# Patient Record
Sex: FEMALE | Race: WHITE | HISPANIC OR LATINO | Employment: FULL TIME | URBAN - METROPOLITAN AREA
[De-identification: names, ages, dates, MRNs, and addresses within clinical notes are randomized per-mention and may not be internally consistent; named-entity substitution may affect disease eponyms.]

---

## 2017-03-12 ENCOUNTER — OFFICE VISIT (OUTPATIENT)
Dept: URGENT CARE | Facility: PHYSICIAN GROUP | Age: 37
End: 2017-03-12
Payer: COMMERCIAL

## 2017-03-12 VITALS
BODY MASS INDEX: 27.14 KG/M2 | TEMPERATURE: 99.9 F | HEART RATE: 104 BPM | SYSTOLIC BLOOD PRESSURE: 126 MMHG | RESPIRATION RATE: 18 BRPM | WEIGHT: 159 LBS | DIASTOLIC BLOOD PRESSURE: 78 MMHG | HEIGHT: 64 IN | OXYGEN SATURATION: 96 %

## 2017-03-12 DIAGNOSIS — J06.9 VIRAL UPPER RESPIRATORY ILLNESS: ICD-10-CM

## 2017-03-12 DIAGNOSIS — F17.200 SMOKER: ICD-10-CM

## 2017-03-12 PROCEDURE — 99203 OFFICE O/P NEW LOW 30 MIN: CPT | Performed by: FAMILY MEDICINE

## 2017-03-12 RX ORDER — BENZONATATE 200 MG/1
200 CAPSULE ORAL 3 TIMES DAILY PRN
Qty: 40 CAP | Refills: 0 | Status: SHIPPED | OUTPATIENT
Start: 2017-03-12 | End: 2019-01-24

## 2017-03-12 ASSESSMENT — ENCOUNTER SYMPTOMS
COUGH: 1
RHINORRHEA: 1
SHORTNESS OF BREATH: 1
HEADACHES: 0
HEMOPTYSIS: 0
VOMITING: 0
DIZZINESS: 0
SWEATS: 1
NAUSEA: 0
CHILLS: 1
WHEEZING: 1
SORE THROAT: 0
FEVER: 1

## 2017-03-12 NOTE — PROGRESS NOTES
Subjective:      Leo Alarcon is a 36 y.o. female who presents with Cough            Cough  This is a new problem. The current episode started more than 1 month ago. The problem has been gradually worsening. The problem occurs constantly. The cough is productive of sputum (yellow). Associated symptoms include chills, a fever, nasal congestion, postnasal drip, rhinorrhea, shortness of breath, sweats and wheezing. Pertinent negatives include no chest pain, ear congestion, ear pain, headaches, hemoptysis, rash or sore throat. The symptoms are aggravated by lying down. Treatments tried: thera-flu, nyquil, dayquil, nebulizations. The treatment provided mild relief. Her past medical history is significant for asthma, bronchitis, environmental allergies and pneumonia.       Review of Systems   Constitutional: Positive for fever and chills.   HENT: Positive for postnasal drip and rhinorrhea. Negative for ear pain and sore throat.    Respiratory: Positive for cough, shortness of breath and wheezing. Negative for hemoptysis.    Cardiovascular: Negative for chest pain.   Gastrointestinal: Negative for nausea and vomiting.   Skin: Negative for rash.   Neurological: Negative for dizziness and headaches.   Endo/Heme/Allergies: Positive for environmental allergies.     PMH:  has a past medical history of S/P tooth extraction and ASTHMA. She also has no past medical history of Diabetes or Seizure (CMS-[facility]).  MEDS:   Current outpatient prescriptions:   •  docusate sodium 100 MG CAPS, Take 100 mg by mouth 2 times a day as needed for Constipation., Disp: 60 Cap, Rfl: 0  •  ibuprofen (MOTRIN) 600 MG TABS, Take 1.5 Tabs by mouth every 6 hours as needed (Cramping)., Disp: 30 Tab, Rfl: 1  •  prenatal vit/fe fumarate/fa (PRENATAL S) 27-0.8 MG TABS, Take 1 Tab by mouth every morning., Disp: , Rfl:   ALLERGIES:   Allergies   Allergen Reactions   • Allegra-D      SURGHX: History reviewed. No pertinent past surgical history.  SOCHX:   "reports that she has been smoking Cigarettes.  She has a 5 pack-year smoking history. She does not have any smokeless tobacco history on file. She reports that she does not drink alcohol or use illicit drugs.  FH: Family history was reviewed, no pertinent findings to report       Objective:     /78 mmHg  Pulse 104  Temp(Src) 37.7 °C (99.9 °F)  Resp 18  Ht 1.626 m (5' 4.02\")  Wt 72.122 kg (159 lb)  BMI 27.28 kg/m2  SpO2 96%     Physical Exam   Constitutional: She appears well-developed.   HENT:   Head: Normocephalic.   Right Ear: External ear normal.   Left Ear: External ear normal.   Mouth/Throat: Oropharyngeal exudate present.   Nasal congestion   Eyes: Pupils are equal, round, and reactive to light. Right eye exhibits no discharge. Left eye exhibits no discharge.   Neck: Neck supple. No thyromegaly present.   Cardiovascular: Normal rate.  Exam reveals no friction rub.    No murmur heard.  Pulmonary/Chest: Effort normal. No respiratory distress. She has no wheezes.   Abdominal: Soft. She exhibits no distension. There is no tenderness. There is no guarding.   Lymphadenopathy:     She has no cervical adenopathy.   Neurological: She is alert.   Skin: Skin is warm and dry. No erythema.   Psychiatric: She has a normal mood and affect. Her behavior is normal.               Assessment/Plan:     1. Viral upper respiratory illness  benzonatate (TESSALON) 200 MG capsule   2. Smoker       Supportive care  Push fluids  Monitor temperature  Follow-up if symptoms worsen or fail to improve    monitor for dehydration, difficulty swallowing, respiratory distress wheezing, and shortness of breath. If the above symptoms should occur the patient was directed to go to the emergency department for an evaluation.    "

## 2017-03-12 NOTE — MR AVS SNAPSHOT
"        Leo Alarcon   3/12/2017 3:30 PM   Office Visit   MRN: [id number]    Department:  [facility]   Dept Phone:  [phone]    Description:  Female : 1980   Provider:  Jose Calderon M.D.           Reason for Visit     Cough cough x3 days      Allergies as of 3/12/2017     Allergen Noted Reactions    Allegra-D 2011         You were diagnosed with     Viral upper respiratory illness   [505856]       Smoker   [[id number]]         Vital Signs     Blood Pressure Pulse Temperature Respirations Height Weight    126/78 mmHg 104 37.7 °C (99.9 °F) 18 1.626 m (5' 4.02\") 72.122 kg (159 lb)    Body Mass Index Oxygen Saturation Smoking Status             27.28 kg/m2 96% Current Every Day Smoker         Basic Information     Date Of Birth Sex Race Ethnicity Preferred Language    1980 Female Other, White Non- English      Problem List              ICD-10-CM Priority Class Noted - Resolved    Labor and delivery indication for care or intervention O75.9   2014 - Present    Polyhydramnios O40.9XX0   2014 - Present    Smoker F17.200   3/12/2017 - Present      Health Maintenance        Date Due Completion Dates    PAP SMEAR 2001 ---    IMM INFLUENZA (1) 2016 ---    IMM DTaP/Tdap/Td Vaccine (2 - Td) 2024            Current Immunizations     Pneumococcal polysaccharide vaccine (PPSV-23) 2014 10:25 PM    Tdap Vaccine 2014  4:15 PM      Below and/or attached are the medications your provider expects you to take. Review all of your home medications and newly ordered medications with your provider and/or pharmacist. Follow medication instructions as directed by your provider and/or pharmacist. Please keep your medication list with you and share with your provider. Update the information when medications are discontinued, doses are changed, or new medications (including over-the-counter products) are added; and carry medication information at all " times in the event of emergency situations     Allergies:  ALLEGRA-D - (reactions not documented)               Medications  Valid as of: March 12, 2017 -  3:54 PM    Generic Name Brand Name Tablet Size Instructions for use    Benzonatate (Cap) TESSALON 200 MG Take 1 Cap by mouth 3 times a day as needed for Cough.        Docusate Sodium (Cap)  MG Take 100 mg by mouth 2 times a day as needed for Constipation.        Ibuprofen (Tab) MOTRIN 600 MG Take 1.5 Tabs by mouth every 6 hours as needed (Cramping).        Prenatal Vit-Fe Fumarate-FA (Tab) PRENATAL S 27-0.8 MG Take 1 Tab by mouth every morning.        .                 Medicines prescribed today were sent to:     [facility] #[id number] - [place], [place] - [address] AT [address] [address]    Phone: [phone] Fax: [phone]    Open 24 Hours?: No      Medication refill instructions:       If your prescription bottle indicates you have medication refills left, it is not necessary to call your provider’s office. Please contact your pharmacy and they will refill your medication.    If your prescription bottle indicates you do not have any refills left, you may request refills at any time through one of the following ways: The online edPULSE system (except Urgent Care), by calling your provider’s office, or by asking your pharmacy to contact your provider’s office with a refill request. Medication refills are processed only during regular business hours and may not be available until the next business day. Your provider may request additional information or to have a follow-up visit with you prior to refilling your medication.   *Please Note: Medication refills are assigned a new Rx number when refilled electronically. Your pharmacy may indicate that no refills were authorized even though a new prescription for the same medication is available at the pharmacy. Please request the medicine by name with the pharmacy  before contacting your provider for a refill.           Tripnary Access Code: [email or web address]  Expires: 3/31/2017  3:19 PM    Tripnary  A secure, online tool to manage your health information     [employer]’s Tripnary® is a secure, online tool that connects you to your personalized health information from the privacy of your home -- day or night - making it very easy for you to manage your healthcare. Once the activation process is completed, you can even access your medical information using the Tripnary kris, which is available for free in the Apple Kris store or Google Play store.     Tripnary provides the following levels of access (as shown below):   My Chart Features   [facility] Primary Care Doctor [facility]  Specialists [facility]  Urgent  Care Non-[facility]  Primary Care  Doctor   Email your healthcare team securely and privately 24/7 X X X    Manage appointments: schedule your next appointment; view details of past/upcoming appointments X      Request prescription refills. X      View recent personal medical records, including lab and immunizations X X X X   View health record, including health history, allergies, medications X X X X   Read reports about your outpatient visits, procedures, consult and ER notes X X X X   See your discharge summary, which is a recap of your hospital and/or ER visit that includes your diagnosis, lab results, and care plan. X X       How to register for Tripnary:  1. Go to  [email or web address].  2. Click on the Sign Up Now box, which takes you to the New Member Sign Up page. You will need to provide the following information:  a. Enter your Tripnary Access Code exactly as it appears at the top of this page. (You will not need to use this code after you’ve completed the sign-up process. If you do not sign up before the expiration date, you must request a new code.)   b. Enter your date of birth.   c. Enter your home email address.   d. Click Submit, and follow the next screen’s  instructions.  3. Create a [email or web address] ID. This will be your MusicGremlin login ID and cannot be changed, so think of one that is secure and easy to remember.  4. Create a [email or web address] password. You can change your password at any time.  5. Enter your Password Reset Question and Answer. This can be used at a later time if you forget your password.   6. Enter your e-mail address. This allows you to receive e-mail notifications when new information is available in MusicGremlin.  7. Click Sign Up. You can now view your health information.    For assistance activating your MusicGremlin account, call [phone]        Quit Tobacco Information     Do you want to quit using tobacco?    Quitting tobacco decreases risks of cancer, heart and lung disease, increases life expectancy, improves sense of taste and smell, and increases spending money, among other benefits.    If you are thinking about quitting, we can help.  • [facility] [place] [facility] Quit Tobacco Program: [phone]  o Program occurs weekly for four weeks and includes pharmacist consultation on products to support quitting smoking or chewing tobacco. A provider referral is needed for pharmacist consultation.  • Tobacco Users Help Hotline: 5-230-QUIT-NOW (867-3225) or https://[place].quitlogix.org/  o Free, confidential telephone and online coaching for [place] residents. Sessions are designed on a schedule that is convenient for you. Eligible clients receive free nicotine replacement therapy.  • Nationally: www.smokefree.gov  o Information and professional assistance to support both immediate and long-term needs as you become, and remain, a non-smoker. Smokefree.gov allows you to choose the help that best fits your needs.

## 2017-03-12 NOTE — Clinical Note
March 12, 2017         Patient: Leo Alarcon   YOB: 1980   Date of Visit: 3/12/2017           To Whom it May Concern:    Leo Alarcon was seen in my clinic on 3/12/2017.    If you have any questions or concerns, please don't hesitate to call.        Sincerely,           Jose Calderon M.D.  Electronically Signed

## 2017-04-21 ENCOUNTER — OFFICE VISIT (OUTPATIENT)
Dept: URGENT CARE | Facility: PHYSICIAN GROUP | Age: 37
End: 2017-04-21
Payer: COMMERCIAL

## 2017-04-21 ENCOUNTER — HOSPITAL ENCOUNTER (OUTPATIENT)
Facility: MEDICAL CENTER | Age: 37
End: 2017-04-21
Attending: PHYSICIAN ASSISTANT
Payer: COMMERCIAL

## 2017-04-21 VITALS
SYSTOLIC BLOOD PRESSURE: 120 MMHG | BODY MASS INDEX: 27.14 KG/M2 | TEMPERATURE: 98 F | DIASTOLIC BLOOD PRESSURE: 90 MMHG | HEART RATE: 95 BPM | RESPIRATION RATE: 14 BRPM | WEIGHT: 159 LBS | OXYGEN SATURATION: 99 % | HEIGHT: 64 IN

## 2017-04-21 DIAGNOSIS — L02.91 ABSCESS: ICD-10-CM

## 2017-04-21 LAB
GRAM STN SPEC: NORMAL
SIGNIFICANT IND 70042: NORMAL
SITE SITE: NORMAL
SOURCE SOURCE: NORMAL

## 2017-04-21 PROCEDURE — 87205 SMEAR GRAM STAIN: CPT

## 2017-04-21 PROCEDURE — 10060 I&D ABSCESS SIMPLE/SINGLE: CPT | Performed by: PHYSICIAN ASSISTANT

## 2017-04-21 PROCEDURE — 87070 CULTURE OTHR SPECIMN AEROBIC: CPT

## 2017-04-21 RX ORDER — HYDROCODONE BITARTRATE AND ACETAMINOPHEN 5; 325 MG/1; MG/1
1 TABLET ORAL EVERY 4 HOURS PRN
Qty: 15 TAB | Refills: 0 | Status: SHIPPED | OUTPATIENT
Start: 2017-04-21 | End: 2019-01-24

## 2017-04-21 RX ORDER — SULFAMETHOXAZOLE AND TRIMETHOPRIM 800; 160 MG/1; MG/1
1 TABLET ORAL 2 TIMES DAILY
Qty: 14 TAB | Refills: 0 | Status: SHIPPED | OUTPATIENT
Start: 2017-04-21 | End: 2017-04-28

## 2017-04-21 ASSESSMENT — ENCOUNTER SYMPTOMS
FEVER: 0
COUGH: 0
SHORTNESS OF BREATH: 0
PALPITATIONS: 0

## 2017-04-21 ASSESSMENT — PATIENT HEALTH QUESTIONNAIRE - PHQ9: CLINICAL INTERPRETATION OF PHQ2 SCORE: 0

## 2017-04-21 ASSESSMENT — PAIN SCALES - GENERAL: PAINLEVEL: 10=SEVERE PAIN

## 2017-04-21 NOTE — PATIENT INSTRUCTIONS
Abscess  Care After  An abscess (also called a boil or furuncle) is an infected area that contains a collection of pus. Signs and symptoms of an abscess include pain, tenderness, redness, or hardness, or you may feel a moveable soft area under your skin. An abscess can occur anywhere in the body. The infection may spread to surrounding tissues causing cellulitis. A cut (incision) by the surgeon was made over your abscess and the pus was drained out. Gauze may have been packed into the space to provide a drain that will allow the cavity to heal from the inside outwards. The boil may be painful for 5 to 7 days. Most people with a boil do not have high fevers. Your abscess, if seen early, may not have localized, and may not have been lanced. If not, another appointment may be required for this if it does not get better on its own or with medications.  HOME CARE INSTRUCTIONS   · Only take over-the-counter or prescription medicines for pain, discomfort, or fever as directed by your caregiver.  · When you bathe, soak and then remove gauze or iodoform packs at least daily or as directed by your caregiver. You may then wash the wound gently with mild soapy water. Repack with gauze or do as your caregiver directs.  SEEK IMMEDIATE MEDICAL CARE IF:   · You develop increased pain, swelling, redness, drainage, or bleeding in the wound site.  · You develop signs of generalized infection including muscle aches, chills, fever, or a general ill feeling.  · An oral temperature above 102° F (38.9° C) develops, not controlled by medication.  See your caregiver for a recheck if you develop any of the symptoms described above. If medications (antibiotics) were prescribed, take them as directed.  Document Released: 07/06/2006 Document Revised: 03/11/2013 Document Reviewed: 03/02/2009  [employer]® Patient Information ©2014 [employer].

## 2017-04-21 NOTE — PROGRESS NOTES
Subjective:      Leo Alarcon is a 36 y.o. female who presents with Knee Pain and Cyst            Cyst  This is a new problem. The current episode started in the past 7 days. The problem occurs constantly. The problem has been gradually worsening. Pertinent negatives include no chest pain, coughing or fever. Associated symptoms comments: Redness and swelling on right buttox. She has tried nothing for the symptoms.       Review of Systems   Constitutional: Negative for fever and malaise/fatigue.   Respiratory: Negative for cough and shortness of breath.    Cardiovascular: Negative for chest pain and palpitations.   Skin:        Abscess on right buttox     All other systems reviewed and are negative.  PMH:  has a past medical history of S/P tooth extraction and ASTHMA. She also has no past medical history of Diabetes or Seizure (CMS-[facility]).  MEDS:   Current outpatient prescriptions:   •  sulfamethoxazole-trimethoprim (BACTRIM DS) 800-160 MG tablet, Take 1 Tab by mouth 2 times a day for 7 days., Disp: 14 Tab, Rfl: 0  •  hydrocodone-acetaminophen (NORCO) 5-325 MG Tab per tablet, Take 1 Tab by mouth every four hours as needed., Disp: 15 Tab, Rfl: 0  •  benzonatate (TESSALON) 200 MG capsule, Take 1 Cap by mouth 3 times a day as needed for Cough. (Patient not taking: Reported on 4/21/2017), Disp: 40 Cap, Rfl: 0  •  docusate sodium 100 MG CAPS, Take 100 mg by mouth 2 times a day as needed for Constipation. (Patient not taking: Reported on 4/21/2017), Disp: 60 Cap, Rfl: 0  •  ibuprofen (MOTRIN) 600 MG TABS, Take 1.5 Tabs by mouth every 6 hours as needed (Cramping)., Disp: 30 Tab, Rfl: 1  •  prenatal vit/fe fumarate/fa (PRENATAL S) 27-0.8 MG TABS, Take 1 Tab by mouth every morning., Disp: , Rfl:   ALLERGIES:   Allergies   Allergen Reactions   • Allegra-D      SURGHX: History reviewed. No pertinent past surgical history.  SOCHX:  reports that she has been smoking Cigarettes.  She has a 5 pack-year smoking history. She  "has never used smokeless tobacco. She reports that she does not drink alcohol or use illicit drugs.  FH: Family history was reviewed, no pertinent findings to report  Medications, Allergies, and current problem list reviewed today in Epic       Objective:     /90 mmHg  Pulse 95  Temp(Src) 36.7 °C (98 °F)  Resp 14  Ht 1.626 m (5' 4\")  Wt 72.122 kg (159 lb)  BMI 27.28 kg/m2  SpO2 99%  Breastfeeding? No     Physical Exam   Constitutional: She appears well-developed and well-nourished.   Cardiovascular: Normal rate, regular rhythm and normal heart sounds.    Pulmonary/Chest: Effort normal and breath sounds normal.   Musculoskeletal:        Legs:  Skin: Skin is warm and dry. There is erythema.   Abscess on right buttox with surrounding erythema.  Approx 2 cm diameter.   Psychiatric: She has a normal mood and affect. Her behavior is normal. Judgment and thought content normal.   Vitals reviewed.              Assessment/Plan:   Procedure: Incision and Drainage  -Risks, benefits, and alternatives discussed. Risks including infection, bleeding, nerve damage, and poor cosmetic outcome  -Sterile technique throughout  -Local anesthesia with 2% lidocaine with epinephrine  -Incision with #11 blade into fluctuant area with purulent material expressed  -Culture obtained and packaged for lab  -Cavity probed and any loculations bluntly taken down with hemostat  -Irrigated copiously with NS  -Packed with 1/4\" gauze  -Minimal bleeding with good hemostasis achieved  -The patient tolerated the procedure well    1. Abscess    - CULTURE WOUND W/ GRAM STAIN; Future  - sulfamethoxazole-trimethoprim (BACTRIM DS) 800-160 MG tablet; Take 1 Tab by mouth 2 times a day for 7 days.  Dispense: 14 Tab; Refill: 0  - hydrocodone-acetaminophen (NORCO) 5-325 MG Tab per tablet; Take 1 Tab by mouth every four hours as needed.  Dispense: 15 Tab; Refill: 0  - Follow up in 2 days for wound check    Differential diagnosis, natural history, " supportive care, and indications for immediate follow-up discussed at length.   Follow-up with primary care provider within 4-5 days, emergency room precautions discussed.  Patient and/or family appears understanding of information.  [facility] Aware web site evaluation: I have obtained and reviewed patient utilization report from [facility] pharmacy database prior to writing prescription for controlled substance II, III or IV per [place] bill . Based on the report and my clinical assessment the prescription is medically necessary.

## 2017-04-21 NOTE — MR AVS SNAPSHOT
"        Leo Alarcon   2017 10:50 AM   Office Visit   MRN: [id number]    Department:  [facility]   Dept Phone:  [phone]    Description:  Female : 1980   Provider:  Ottoniel Serrato PA-C           Reason for Visit     Knee Pain swollen x5days     Cyst R bottom      Allergies as of 2017     Allergen Noted Reactions    Allegra-D 2011         You were diagnosed with     Abscess   [[id number]]         Vital Signs     Blood Pressure Pulse Temperature Respirations Height Weight    120/90 mmHg 95 36.7 °C (98 °F) 14 1.626 m (5' 4\") 72.122 kg (159 lb)    Body Mass Index Oxygen Saturation Breastfeeding? Smoking Status          27.28 kg/m2 99% No Current Every Day Smoker        Basic Information     Date Of Birth Sex Race Ethnicity Preferred Language    1980 Female Other, White Non- English      Problem List              ICD-10-CM Priority Class Noted - Resolved    Labor and delivery indication for care or intervention O75.9   2014 - Present    Polyhydramnios O40.9XX0   2014 - Present    Smoker F17.200   3/12/2017 - Present      Health Maintenance        Date Due Completion Dates    PAP SMEAR 2001 ---    IMM DTaP/Tdap/Td Vaccine (2 - Td) 2024            Current Immunizations     Pneumococcal polysaccharide vaccine (PPSV-23) 2014 10:25 PM    Tdap Vaccine 2014  4:15 PM      Below and/or attached are the medications your provider expects you to take. Review all of your home medications and newly ordered medications with your provider and/or pharmacist. Follow medication instructions as directed by your provider and/or pharmacist. Please keep your medication list with you and share with your provider. Update the information when medications are discontinued, doses are changed, or new medications (including over-the-counter products) are added; and carry medication information at all times in the event of emergency situations    " Allergies:  ALLEGRA-D - (reactions not documented)               Medications  Valid as of: April 21, 2017 - 11:56 AM    Generic Name Brand Name Tablet Size Instructions for use    Benzonatate (Cap) TESSALON 200 MG Take 1 Cap by mouth 3 times a day as needed for Cough.        Docusate Sodium (Cap)  MG Take 100 mg by mouth 2 times a day as needed for Constipation.        Hydrocodone-Acetaminophen (Tab) NORCO 5-325 MG Take 1 Tab by mouth every four hours as needed.        Ibuprofen (Tab) MOTRIN 600 MG Take 1.5 Tabs by mouth every 6 hours as needed (Cramping).        Prenatal Vit-Fe Fumarate-FA (Tab) PRENATAL S 27-0.8 MG Take 1 Tab by mouth every morning.        Sulfamethoxazole-Trimethoprim (Tab) BACTRIM -160 MG Take 1 Tab by mouth 2 times a day for 7 days.        .                 Medicines prescribed today were sent to:     [facility]/PHARMACY #[id number] - [place], [place] - [address] [address]    Phone: [phone] Fax: [phone]    Open 24 Hours?: No      Medication refill instructions:       If your prescription bottle indicates you have medication refills left, it is not necessary to call your provider’s office. Please contact your pharmacy and they will refill your medication.    If your prescription bottle indicates you do not have any refills left, you may request refills at any time through one of the following ways: The online [employer] system (except Urgent Care), by calling your provider’s office, or by asking your pharmacy to contact your provider’s office with a refill request. Medication refills are processed only during regular business hours and may not be available until the next business day. Your provider may request additional information or to have a follow-up visit with you prior to refilling your medication.   *Please Note: Medication refills are assigned a new Rx number when refilled electronically. Your pharmacy may indicate that  no refills were authorized even though a new prescription for the same medication is available at the pharmacy. Please request the medicine by name with the pharmacy before contacting your provider for a refill.        Your To Do List     Future Labs/Procedures Complete By Expires    CULTURE WOUND W/ GRAM STAIN  As directed 4/21/2018      Instructions    Abscess  Care After  An abscess (also called a boil or furuncle) is an infected area that contains a collection of pus. Signs and symptoms of an abscess include pain, tenderness, redness, or hardness, or you may feel a moveable soft area under your skin. An abscess can occur anywhere in the body. The infection may spread to surrounding tissues causing cellulitis. A cut (incision) by the surgeon was made over your abscess and the pus was drained out. Gauze may have been packed into the space to provide a drain that will allow the cavity to heal from the inside outwards. The boil may be painful for 5 to 7 days. Most people with a boil do not have high fevers. Your abscess, if seen early, may not have localized, and may not have been lanced. If not, another appointment may be required for this if it does not get better on its own or with medications.  HOME CARE INSTRUCTIONS   · Only take over-the-counter or prescription medicines for pain, discomfort, or fever as directed by your caregiver.  · When you bathe, soak and then remove gauze or iodoform packs at least daily or as directed by your caregiver. You may then wash the wound gently with mild soapy water. Repack with gauze or do as your caregiver directs.  SEEK IMMEDIATE MEDICAL CARE IF:   · You develop increased pain, swelling, redness, drainage, or bleeding in the wound site.  · You develop signs of generalized infection including muscle aches, chills, fever, or a general ill feeling.  · An oral temperature above 102° F (38.9° C) develops, not controlled by medication.  See your caregiver for a recheck if you  develop any of the symptoms described above. If medications (antibiotics) were prescribed, take them as directed.  Document Released: 07/06/2006 Document Revised: 03/11/2013 Document Reviewed: 03/02/2009  ExitCare® Patient Information ©2014 [employer].            [employer] Access Code: [email or web address]  Expires: 5/21/2017 11:51 AM    [employer]  A secure, online tool to manage your health information     [employer]’s [employer]® is a secure, online tool that connects you to your personalized health information from the privacy of your home -- day or night - making it very easy for you to manage your healthcare. Once the activation process is completed, you can even access your medical information using the Mojo Motors kris, which is available for free in the Apple Kris store or Google Play store.     Mo[employer] provides the following levels of access (as shown below):   My Chart Features   [facility]  Specialists [facility]  Urgent  Care Non-[facility]  Primary Care  Doctor   Email your healthcare team securely and privately 24/7 X X X    Manage appointments: schedule your next appointment; view details of past/upcoming appointments X      Request prescription refills. X      View recent personal medical records, including lab and immunizations X X X X   View health record, including health history, allergies, medications X X X X   Read reports about your outpatient visits, procedures, consult and ER notes X X X X   See your discharge summary, which is a recap of your hospital and/or ER visit that includes your diagnosis, lab results, and care plan. X X       How to register for [employer]:  1. Go to  [email or web address].  2. Click on the Sign Up Now box, which takes you to the New Member Sign Up page. You will need to provide the following information:  a. Enter your Mo[employer] Access Code exactly as it appears at the top of this page. (You will not need to use this code after you’ve completed the sign-up  process. If you do not sign up before the expiration date, you must request a new code.)   b. Enter your date of birth.   c. Enter your home email address.   d. Click Submit, and follow the next screen’s instructions.  3. Create a One Diary ID. This will be your One Diary login ID and cannot be changed, so think of one that is secure and easy to remember.  4. Create a [employer] S[employer] password. You can change your password at any time.  5. Enter your Password Reset Question and Answer. This can be used at a later time if you forget your password.   6. Enter your e-mail address. This allows you to receive e-mail notifications when new information is available in One Diary.  7. Click Sign Up. You can now view your health information.    For assistance activating your One Diary account, call [phone]        Quit Tobacco Information     Do you want to quit using tobacco?    Quitting tobacco decreases risks of cancer, heart and lung disease, increases life expectancy, improves sense of taste and smell, and increases spending money, among other benefits.    If you are thinking about quitting, we can help.  • [facility] Quit Tobacco Program: [phone]  o Program occurs weekly for four weeks and includes pharmacist consultation on products to support quitting smoking or chewing tobacco. A provider referral is needed for pharmacist consultation.  • Tobacco Users Help Hotline: 0-615-QUIT-NOW (425-2206) or https://[place].quitlogix.org/  o Free, confidential telephone and online coaching for [place] residents. Sessions are designed on a schedule that is convenient for you. Eligible clients receive free nicotine replacement therapy.  • Nationally: www.smokefree.gov  o Information and professional assistance to support both immediate and long-term needs as you become, and remain, a non-smoker. Smokefree.gov allows you to choose the help that best fits your needs.

## 2017-04-23 LAB
BACTERIA WND AEROBE CULT: NORMAL
GRAM STN SPEC: NORMAL
SIGNIFICANT IND 70042: NORMAL
SITE SITE: NORMAL
SOURCE SOURCE: NORMAL

## 2017-04-24 ENCOUNTER — OFFICE VISIT (OUTPATIENT)
Dept: URGENT CARE | Facility: PHYSICIAN GROUP | Age: 37
End: 2017-04-24
Payer: COMMERCIAL

## 2017-04-24 VITALS
HEART RATE: 95 BPM | BODY MASS INDEX: 27.31 KG/M2 | WEIGHT: 160 LBS | DIASTOLIC BLOOD PRESSURE: 84 MMHG | HEIGHT: 64 IN | TEMPERATURE: 98.3 F | OXYGEN SATURATION: 97 % | SYSTOLIC BLOOD PRESSURE: 120 MMHG

## 2017-04-24 DIAGNOSIS — Z51.89 WOUND CHECK, ABSCESS: Primary | ICD-10-CM

## 2017-04-24 DIAGNOSIS — L02.31 ABSCESS OF BUTTOCK, RIGHT: ICD-10-CM

## 2017-04-24 PROCEDURE — 99024 POSTOP FOLLOW-UP VISIT: CPT | Performed by: PHYSICIAN ASSISTANT

## 2017-04-24 NOTE — MR AVS SNAPSHOT
"        Leo Alarcon   2017 9:05 AM   Office Visit   MRN: [id number]    Department:  [facility]   Dept Phone:  [phone]    Description:  Female : 1980   Provider:  Cindy Carrasquillo PA-C           Reason for Visit     Other wound check cyst on buttocks      Allergies as of 2017     Allergen Noted Reactions    Allegra-D 2011         You were diagnosed with     Wound check, abscess   [[id number]]         Vital Signs     Blood Pressure Pulse Temperature Height Weight Body Mass Index    120/84 mmHg 95 36.8 °C (98.3 °F) 1.626 m (5' 4\") 72.576 kg (160 lb) 27.45 kg/m2    Oxygen Saturation Smoking Status                97% Current Every Day Smoker          Basic Information     Date Of Birth Sex Race Ethnicity Preferred Language    1980 Female Other, White Non- English      Problem List              ICD-10-CM Priority Class Noted - Resolved    Labor and delivery indication for care or intervention O75.9   2014 - Present    Polyhydramnios O40.9XX0   2014 - Present    Smoker F17.200   3/12/2017 - Present      Health Maintenance        Date Due Completion Dates    PAP SMEAR 2001 ---    IMM DTaP/Tdap/Td Vaccine (2 - Td) 2024            Current Immunizations     Pneumococcal polysaccharide vaccine (PPSV-23) 2014 10:25 PM    Tdap Vaccine 2014  4:15 PM      Below and/or attached are the medications your provider expects you to take. Review all of your home medications and newly ordered medications with your provider and/or pharmacist. Follow medication instructions as directed by your provider and/or pharmacist. Please keep your medication list with you and share with your provider. Update the information when medications are discontinued, doses are changed, or new medications (including over-the-counter products) are added; and carry medication information at all times in the event of emergency situations     Allergies:  ALLEGRA-D - " (reactions not documented)               Medications  Valid as of: April 24, 2017 -  9:42 AM    Generic Name Brand Name Tablet Size Instructions for use    Benzonatate (Cap) TESSALON 200 MG Take 1 Cap by mouth 3 times a day as needed for Cough.        Docusate Sodium (Cap)  MG Take 100 mg by mouth 2 times a day as needed for Constipation.        Hydrocodone-Acetaminophen (Tab) NORCO 5-325 MG Take 1 Tab by mouth every four hours as needed.        Ibuprofen (Tab) MOTRIN 600 MG Take 1.5 Tabs by mouth every 6 hours as needed (Cramping).        Prenatal Vit-Fe Fumarate-FA (Tab) PRENATAL S 27-0.8 MG Take 1 Tab by mouth every morning.        Sulfamethoxazole-Trimethoprim (Tab) BACTRIM -160 MG Take 1 Tab by mouth 2 times a day for 7 days.        .                 Medicines prescribed today were sent to:     [facility]/PHARMACY #[id number] - [place], [place] - [address] [facility] AT [address] [address]    Phone: [phone] Fax: [phone]    Open 24 Hours?: No      Medication refill instructions:       If your prescription bottle indicates you have medication refills left, it is not necessary to call your provider’s office. Please contact your pharmacy and they will refill your medication.    If your prescription bottle indicates you do not have any refills left, you may request refills at any time through one of the following ways: The online [employer] system (except Urgent Care), by calling your provider’s office, or by asking your pharmacy to contact your provider’s office with a refill request. Medication refills are processed only during regular business hours and may not be available until the next business day. Your provider may request additional information or to have a follow-up visit with you prior to refilling your medication.   *Please Note: Medication refills are assigned a new Rx number when refilled electronically. Your pharmacy may indicate that no refills were  authorized even though a new prescription for the same medication is available at the pharmacy. Please request the medicine by name with the pharmacy before contacting your provider for a refill.           Am[employer] Software Access Code: [email or web address]  Expires: 5/21/2017 11:51 AM    Amcom Software  A secure, online tool to manage your health information     [employer]’s Amcom Software® is a secure, online tool that connects you to your personalized health information from the privacy of your home -- day or night - making it very easy for you to manage your healthcare. Once the activation process is completed, you can even access your medical information using the Amcom Software kris, which is available for free in the Apple Kris store or Google Play store.     Amcom Software provides the following levels of access (as shown below):   My Chart Features   [facility] Doctor [facility]  Specialists [facility]  Urgent  Care Non-[facility]  Primary Care  Doctor   Email your healthcare team securely and privately 24/7 X X X    Manage appointments: schedule your next appointment; view details of past/upcoming appointments X      Request prescription refills. X      View recent personal medical records, including lab and immunizations X X X X   View health record, including health history, allergies, medications X X X X   Read reports about your outpatient visits, procedures, consult and ER notes X X X X   See your discharge summary, which is a recap of your hospital and/or ER visit that includes your diagnosis, lab results, and care plan. X X       How to register for Amcom Software:  1. Go to  [email or web address].  2. Click on the Sign Up Now box, which takes you to the New Member Sign Up page. You will need to provide the following information:  a. Enter your Amcom Software Access Code exactly as it appears at the top of this page. (You will not need to use this code after you’ve completed the sign-up process. If you do not sign up before the expiration date, you  must request a new code.)   b. Enter your date of birth.   c. Enter your home email address.   d. Click Submit, and follow the next screen’s instructions.  3. Create a Wikkit LLCt ID. This will be your Netseer login ID and cannot be changed, so think of one that is secure and easy to remember.  4. Create a Wik[employer] LLCt password. You can change your password at any time.  5. Enter your Password Reset Question and Answer. This can be used at a later time if you forget your password.   6. Enter your e-mail address. This allows you to receive e-mail notifications when new information is available in Netseer.  7. Click Sign Up. You can now view your health information.    For assistance activating your Netseer account, call [phone]        Quit Tobacco Information     Do you want to quit using tobacco?    Quitting tobacco decreases risks of cancer, heart and lung disease, increases life expectancy, improves sense of taste and smell, and increases spending money, among other benefits.    If you are thinking about quitting, we can help.  • [facility]uit [facility]: 467.260.6194  o Program occurs weekly for four weeks and includes pharmacist consultation on products to support quitting smoking or chewing tobacco. A provider referral is needed for pharmacist consultation.  • Tobacco Users Help Hotline: 5-358-QUIT-NOW (516-5903) or https://[place].quitlogix.org/  o Free, confidential telephone and online coaching for [place] residents. Sessions are designed on a schedule that is convenient for you. Eligible clients receive free nicotine replacement therapy.  • Nationally: www.smokefree.gov  o Information and professional assistance to support both immediate and long-term needs as you become, and remain, a non-smoker. Smokefree.gov allows you to choose the help that best fits your needs.

## 2017-04-24 NOTE — PROGRESS NOTES
HPI:  Patient seen in urgent care 3 days ago for incision and drainage of abscess to right buttock.  She returns today for wound recheck.  Currently on Bactrim  pending wound culture results.  Patient denies any fevers, increased pain or redness to the wound.  Patient denies and numbnes or weakness surrounding the site.     PE:  Vitals:  Tc  Gen: AOx4, NAD  Neuro/Vas/Tendon: no vascular compromise, sensation normal, no tendon injury, normal rom.  Skin: Healing wound, no extending erythema, some mild discharge noted on iodoform packing.      Wound irrigated and repacked.    PT to return in 3 days for wound check    A/P:  Abcess Buttock

## 2017-04-24 NOTE — Clinical Note
April 24, 2017         Patient: Leo Alarcon   YOB: 1980   Date of Visit: 4/24/2017           To Whom it May Concern:    Leo Alarcon was seen in my clinic on 4/24/2017. She may return to work today..    If you have any questions or concerns, please don't hesitate to call.        Sincerely,           Cindy Carrasquillo PA-C  Electronically Signed

## 2017-04-28 ENCOUNTER — APPOINTMENT (OUTPATIENT)
Dept: URGENT CARE | Facility: PHYSICIAN GROUP | Age: 37
End: 2017-04-28
Payer: COMMERCIAL

## 2017-04-28 ENCOUNTER — OFFICE VISIT (OUTPATIENT)
Dept: URGENT CARE | Facility: PHYSICIAN GROUP | Age: 37
End: 2017-04-28
Payer: COMMERCIAL

## 2017-04-28 VITALS
HEART RATE: 90 BPM | HEIGHT: 64 IN | TEMPERATURE: 98.6 F | SYSTOLIC BLOOD PRESSURE: 122 MMHG | RESPIRATION RATE: 18 BRPM | BODY MASS INDEX: 27.31 KG/M2 | DIASTOLIC BLOOD PRESSURE: 82 MMHG | WEIGHT: 160 LBS | OXYGEN SATURATION: 97 %

## 2017-04-28 DIAGNOSIS — Z51.89 WOUND CHECK, ABSCESS: ICD-10-CM

## 2017-04-28 PROCEDURE — 99024 POSTOP FOLLOW-UP VISIT: CPT | Performed by: PHYSICIAN ASSISTANT

## 2017-04-28 ASSESSMENT — ENCOUNTER SYMPTOMS
FEVER: 0
VOMITING: 0
TINGLING: 0
FALLS: 0
SENSORY CHANGE: 0
CHILLS: 0
DIARRHEA: 0
ABDOMINAL PAIN: 0

## 2017-04-28 NOTE — PROGRESS NOTES
"Subjective:      Leo Alarcon is a 36 y.o. female who presents with Wound Check        HPI: Patient seen in urgent care on 4/21 for incision and drainage of abscess to right buttock.  She returns today for wound recheck.  She has completed Bactrim without any difficulty- identification of pathogen is pending.   Patient denies any fevers, increased pain or redness to the wound.  Patient denies and numbnes or weakness surrounding the site. She reports slight drainage from the site and is changing bandage daily.     Wound Check        Review of Systems   Constitutional: Negative for fever, chills and malaise/fatigue.   Gastrointestinal: Negative for vomiting, abdominal pain and diarrhea.   Musculoskeletal: Negative for joint pain and falls.   Skin: Negative for itching and rash.   Neurological: Negative for tingling and sensory change.          Objective:     /82 mmHg  Pulse 90  Temp(Src) 37 °C (98.6 °F)  Resp 18  Ht 1.626 m (5' 4.02\")  Wt 72.576 kg (160 lb)  BMI 27.45 kg/m2  SpO2 97%   PMH:  has a past medical history of S/P tooth extraction and ASTHMA. She also has no past medical history of Diabetes or Seizure (CMS-[facility]).  MEDS:   Current outpatient prescriptions:   •  sulfamethoxazole-trimethoprim (BACTRIM DS) 800-160 MG tablet, Take 1 Tab by mouth 2 times a day for 7 days., Disp: 14 Tab, Rfl: 0  •  hydrocodone-acetaminophen (NORCO) 5-325 MG Tab per tablet, Take 1 Tab by mouth every four hours as needed., Disp: 15 Tab, Rfl: 0  •  benzonatate (TESSALON) 200 MG capsule, Take 1 Cap by mouth 3 times a day as needed for Cough., Disp: 40 Cap, Rfl: 0  •  docusate sodium 100 MG CAPS, Take 100 mg by mouth 2 times a day as needed for Constipation. (Patient not taking: Reported on 4/21/2017), Disp: 60 Cap, Rfl: 0  •  ibuprofen (MOTRIN) 600 MG TABS, Take 1.5 Tabs by mouth every 6 hours as needed (Cramping)., Disp: 30 Tab, Rfl: 1  •  prenatal vit/fe fumarate/fa (PRENATAL S) 27-0.8 MG TABS, Take 1 Tab by " mouth every morning., Disp: , Rfl:   ALLERGIES:   Allergies   Allergen Reactions   • Allegra-D      SURGHX: History reviewed. No pertinent past surgical history.  SOCHX:  reports that she has been smoking Cigarettes.  She has a 5 pack-year smoking history. She has never used smokeless tobacco. She reports that she does not drink alcohol or use illicit drugs.  FH: Family history was reviewed, no pertinent findings to report    Physical Exam   Constitutional: She is oriented to person, place, and time. She appears well-developed and well-nourished.   HENT:   Head: Normocephalic and atraumatic.   Eyes: EOM are normal. Pupils are equal, round, and reactive to light.   Neck: Normal range of motion. Neck supple.   Cardiovascular: Normal rate.    Pulmonary/Chest: Effort normal. No respiratory distress.   Neurological: She is alert and oriented to person, place, and time.   Skin:   Left buttock- small less than 1 cm open wound- noted packing intact- noted purulent discharge on packing. Irrigated. And continued purulent discharge expelled.   Irrigated. Repacked. Minimal surrounding erythema without any evidence of further cellulitis.    Vitals reviewed.              Assessment/Plan:     1. Wound check, abscess    Continue with dressing changes- packing given to patient. Wound depth is less than 1cm- may not need repacking next visit. RTC in 3 days- unable to return in 2. Other wound care discussed.   Patient given precautionary s/sx that mandate immediate follow up and evaluation in the ED. Advised of risks of not doing so.    DDX, Supportive care, and indications for immediate follow-up discussed with patient.    Instructed to return to clinic or nearest emergency department if we are not available for any change in condition, further concerns, or worsening of symptoms.    The patient demonstrated a good understanding and agreed with the treatment plan.

## 2017-05-01 ENCOUNTER — OFFICE VISIT (OUTPATIENT)
Dept: URGENT CARE | Facility: PHYSICIAN GROUP | Age: 37
End: 2017-05-01
Payer: COMMERCIAL

## 2017-05-01 VITALS
WEIGHT: 160 LBS | TEMPERATURE: 97 F | OXYGEN SATURATION: 97 % | SYSTOLIC BLOOD PRESSURE: 112 MMHG | BODY MASS INDEX: 27.31 KG/M2 | HEIGHT: 64 IN | DIASTOLIC BLOOD PRESSURE: 60 MMHG | HEART RATE: 100 BPM

## 2017-05-01 DIAGNOSIS — Z51.89 WOUND CHECK, ABSCESS: ICD-10-CM

## 2017-05-01 PROCEDURE — 99024 POSTOP FOLLOW-UP VISIT: CPT | Performed by: PHYSICIAN ASSISTANT

## 2017-05-01 NOTE — MR AVS SNAPSHOT
"        Leo Alarcon   2017 8:30 AM   Office Visit   MRN: [id number]    Department:  [facility]   Dept Phone:  [phone]    Description:  Female : 1980   Provider:  Cindy Carrasquillo PA-C           Reason for Visit     Wound Check wound check// cyst on glute       Allergies as of 2017     Allergen Noted Reactions    Allegra-D 2011         Vital Signs     Blood Pressure Pulse Temperature Height Weight Body Mass Index    112/60 mmHg 100 36.1 °C (97 °F) 1.626 m (5' 4.02\") 72.576 kg (160 lb) 27.45 kg/m2    Oxygen Saturation Smoking Status                97% Current Every Day Smoker          Basic Information     Date Of Birth Sex Race Ethnicity Preferred Language    1980 Female Other, White Non- English      Problem List              ICD-10-CM Priority Class Noted - Resolved    Labor and delivery indication for care or intervention O75.9   2014 - Present    Polyhydramnios O40.9XX0   2014 - Present    Smoker F17.200   3/12/2017 - Present      Health Maintenance        Date Due Completion Dates    PAP SMEAR 2001 ---    IMM DTaP/Tdap/Td Vaccine (2 - Td) 2024            Current Immunizations     Pneumococcal polysaccharide vaccine (PPSV-23) 2014 10:25 PM    Tdap Vaccine 2014  4:15 PM      Below and/or attached are the medications your provider expects you to take. Review all of your home medications and newly ordered medications with your provider and/or pharmacist. Follow medication instructions as directed by your provider and/or pharmacist. Please keep your medication list with you and share with your provider. Update the information when medications are discontinued, doses are changed, or new medications (including over-the-counter products) are added; and carry medication information at all times in the event of emergency situations     Allergies:  ALLEGRA-D - (reactions not documented)               Medications  Valid as of: " May 01, 2017 -  8:43 AM    Generic Name Brand Name Tablet Size Instructions for use    Benzonatate (Cap) TESSALON 200 MG Take 1 Cap by mouth 3 times a day as needed for Cough.        Docusate Sodium (Cap)  MG Take 100 mg by mouth 2 times a day as needed for Constipation.        Hydrocodone-Acetaminophen (Tab) NORCO 5-325 MG Take 1 Tab by mouth every four hours as needed.        Ibuprofen (Tab) MOTRIN 600 MG Take 1.5 Tabs by mouth every 6 hours as needed (Cramping).        Prenatal Vit-Fe Fumarate-FA (Tab) PRENATAL S 27-0.8 MG Take 1 Tab by mouth every morning.        .                 Medicines prescribed today were sent to:     [facility]ARMACY #[id number] - [place], [place] - [address] AT [address] [address]    Phone: [phone] Fax: [phone]    Open 24 Hours?: No      Medication refill instructions:       If your prescription bottle indicates you have medication refills left, it is not necessary to call your provider’s office. Please contact your pharmacy and they will refill your medication.    If your prescription bottle indicates you do not have any refills left, you may request refills at any time through one of the following ways: The online TroopSwap system (except Urgent Care), by calling your provider’s office, or by asking your pharmacy to contact your provider’s office with a refill request. Medication refills are processed only during regular business hours and may not be available until the next business day. Your provider may request additional information or to have a follow-up visit with you prior to refilling your medication.   *Please Note: Medication refills are assigned a new Rx number when refilled electronically. Your pharmacy may indicate that no refills were authorized even though a new prescription for the same medication is available at the pharmacy. Please request the medicine by name with the pharmacy before contacting your provider for a  refill.           FatTail Access Code: [email or web address]  Expires: 5/21/2017 11:51 AM    FatTail  A secure, online tool to manage your health information     [employer]’s FatTail® is a secure, online tool that connects you to your personalized health information from the privacy of your home -- day or night - making it very easy for you to manage your healthcare. Once the activation process is completed, you can even access your medical information using the FatTail kris, which is available for free in the Apple Kris store or Google Play store.     FatTail provides the following levels of access (as shown below):   My Chart Features   [facility] Primary Care Doctor [facility]  Specialists [facility]  Urgent  Care Non-[facility]  Primary Care  Doctor   Email your healthcare team securely and privately 24/7 X X X    Manage appointments: schedule your next appointment; view details of past/upcoming appointments X      Request prescription refills. X      View recent personal medical records, including lab and immunizations X X X X   View health record, including health history, allergies, medications X X X X   Read reports about your outpatient visits, procedures, consult and ER notes X X X X   See your discharge summary, which is a recap of your hospital and/or ER visit that includes your diagnosis, lab results, and care plan. X X       How to register for FatTail:  1. Go to  [email or web address].  2. Click on the Sign Up Now box, which takes you to the New Member Sign Up page. You will need to provide the following information:  a. Enter your FatTail Access Code exactly as it appears at the top of this page. (You will not need to use this code after you’ve completed the sign-up process. If you do not sign up before the expiration date, you must request a new code.)   b. Enter your date of birth.   c. Enter your home email address.   d. Click Submit, and follow the next screen’s instructions.  3. Create a FatTail ID. This will  be your StyleJam login ID and cannot be changed, so think of one that is secure and easy to remember.  4. Create a StyleJam password. You can change your password at any time.  5. Enter your Password Reset Question and Answer. This can be used at a later time if you forget your password.   6. Enter your e-mail address. This allows you to receive e-mail notifications when new information is available in StyleJam.  7. Click Sign Up. You can now view your health information.    For assistance activating your StyleJam account, call [phone]        Quit Tobacco Information     Do you want to quit using tobacco?    Quitting tobacco decreases risks of cancer, heart and lung disease, increases life expectancy, improves sense of taste and smell, and increases spending money, among other benefits.    If you are thinking about quitting, we can help.  • [facility] Quit Tobacco Program: [phone]  o Program occurs weekly for four weeks and includes pharmacist consultation on products to support quitting smoking or chewing tobacco. A provider referral is needed for pharmacist consultation.  • Tobacco Users Help Hotline: 3-800-QUIT-NOW (334-3660) or https://[place].quitlogix.org/  o Free, confidential telephone and online coaching for [place] residents. Sessions are designed on a schedule that is convenient for you. Eligible clients receive free nicotine replacement therapy.  • Nationally: www.smokefree.gov  o Information and professional assistance to support both immediate and long-term needs as you become, and remain, a non-smoker. Smokefree.gov allows you to choose the help that best fits your needs.

## 2017-05-01 NOTE — PROGRESS NOTES
HPI:  Patient seen in urgent care on 04/21/2017 for incision and drainage of wound .  He/She was seen on 04/24/2017 for wound recheck at that time was repacked.  She returns today for wound recheck.  Has completed her antibiotics.  Wound culture was neg except normal skin viri.   Patient denies any fevers, increased pain or redness to the wound.  Patient denies and numbnes or weakness surrounding the site.     PE:  Vitals:  Tc  Gen: AOx4, NAD  Neuro/Vas/Tendon: no vascular compromise, sensation normal, no tendon injury, normal rom.  Skin: Healing wound, no extending erythema, some mild discharge noted on iodoform packing.      A/P:  Abcess buttock.    PT wound does not need to be repacked.  Bandages given, wound care explained.   PT should follow up with PCP in 1-2 days for re-evaluation if symptoms have not improved.  Discussed red flags and reasons to return to UC or ED.  Pt and/or family verbalized understanding of diagnosis and follow up instructions and was given informational handout on diagnosis.  PT discharged.

## 2017-09-18 ENCOUNTER — OFFICE VISIT (OUTPATIENT)
Dept: URGENT CARE | Facility: PHYSICIAN GROUP | Age: 37
End: 2017-09-18
Payer: COMMERCIAL

## 2017-09-18 ENCOUNTER — HOSPITAL ENCOUNTER (OUTPATIENT)
Dept: RADIOLOGY | Facility: MEDICAL CENTER | Age: 37
End: 2017-09-18
Attending: PHYSICIAN ASSISTANT
Payer: COMMERCIAL

## 2017-09-18 VITALS
WEIGHT: 164 LBS | SYSTOLIC BLOOD PRESSURE: 138 MMHG | HEIGHT: 64 IN | RESPIRATION RATE: 16 BRPM | OXYGEN SATURATION: 100 % | DIASTOLIC BLOOD PRESSURE: 82 MMHG | TEMPERATURE: 98.4 F | HEART RATE: 89 BPM | BODY MASS INDEX: 28 KG/M2

## 2017-09-18 DIAGNOSIS — M25.561 ANTERIOR KNEE PAIN, RIGHT: ICD-10-CM

## 2017-09-18 DIAGNOSIS — M22.2X1 PATELLOFEMORAL PAIN SYNDROME OF RIGHT KNEE: Primary | ICD-10-CM

## 2017-09-18 PROCEDURE — L1830 KO IMMOB CANVAS LONG PRE OTS: HCPCS | Performed by: PHYSICIAN ASSISTANT

## 2017-09-18 PROCEDURE — 99213 OFFICE O/P EST LOW 20 MIN: CPT | Performed by: PHYSICIAN ASSISTANT

## 2017-09-18 PROCEDURE — 73565 X-RAY EXAM OF KNEES: CPT

## 2017-09-18 PROCEDURE — 73560 X-RAY EXAM OF KNEE 1 OR 2: CPT | Mod: LT

## 2017-09-18 ASSESSMENT — ENCOUNTER SYMPTOMS
CARDIOVASCULAR NEGATIVE: 1
MYALGIAS: 1
RESPIRATORY NEGATIVE: 1
EYES NEGATIVE: 1
PSYCHIATRIC NEGATIVE: 1
CONSTITUTIONAL NEGATIVE: 1
GASTROINTESTINAL NEGATIVE: 1
SENSORY CHANGE: 1

## 2017-09-18 NOTE — PATIENT INSTRUCTIONS
Patella Problems (Patellofemoral Syndrome)  This syndrome is caused by changes in the undersurface of the kneecap (patella). The changes vary from minor inflammation to major changes such as breakdown of the cartilage on the undersurface of the patella. The major changes can be seen with an arthroscope (a small, pencil-sized telescope). These changes can result from various factors. These factors may arise from abnormal tracking (movement or malalignment) of the patella. Normally the Patella is in its normal groove located between the condyles (grooved end) of the femur (thigh bone). Abnormal movement leads to increased pressure in the patellofemoral joint. This leads to swelling in the cartilage, inflammation and pain.  SYMPTOMS   The patient with this syndrome usually has an ache in the knee. It is often aggravated by:  · Prolonged sitting.   · Squatting.   · Climbing stairs.   · Running down hill.   · Other exercising that stresses the knee.   Other findings may include the knee giving way, swelling, and or locking.  TREATMENT   The treatment will depend on the cause of the problem. Sometimes the solution is as simple as cutting down on activities. Giving your joint a rest with the use of crutches and braces can also help. This is generally followed by strengthening exercises.  RECOVERY  Recovery from a patellar problem depends on the type of problem in your knee and on the treatment required. If conservative treatment works the recovery period may be as little as three to four weeks. If more aggressive therapy such as surgery is required, the recovery period may be several months. Your caregiver will discuss this with you.  HOME CARE INSTRUCTIONS  · Following exercise, use an ice pack for twenty to thirty minutes three to four times per day. Use a towel between your ice pack and the skin.   · Reduction of inflammation with anti-inflammatories may be helpful. Only take over-the-counter or prescription medicines  for pain, discomfort, or fever as directed by your caregiver.   · Taping the knee or using a neoprene sleeve with a patellar cutout to provide better tracking of the patella may give relief.   · Muscle (quadriceps) strengthening exercises are helpful. Follow your caregiver's advice.   · Muscle stretching prior to exercise may be helpful.   · Soft tissue therapy using ultrasound, and diathermy may be helpful.   · If conservative therapy is not effective, surgery may provide relief. During arthroscopy, your caregiver may discover a rough surface beneath your kneecap. If this happens, your caregiver may smooth this out by shaving the surface.   SEEK MEDICAL CARE IF:  If you have surgery, see your caregiver if:  · There is increased bleeding or clear fluid (more than a small spot) from the wound.   · You notice redness, swelling, or increasing pain in the wound.   · Pus is coming from wound.   · You develop an unexplained oral temperature above 102° F (38.9° C) develops, or as your caregiver suggests.   · You notice a foul smell coming from the wound or dressing.   · You develop increasing pain or stiffness in your knee.   SEEK IMMEDIATE MEDICAL CARE IF:   · You develop a rash.   · You have difficulty breathing.   · You have any allergic problems.   MAKE SURE YOU:   · Understand these instructions.   · Will watch your condition.   · Will get help right away if you are not doing well or get worse.   Document Released: 12/15/2001 Document Revised: 03/11/2013 Document Reviewed: 01/04/2010  [employer]® Patient Information ©2013 [employer].

## 2017-09-18 NOTE — PROGRESS NOTES
Subjective:      Leo Alarcon is a 36 y.o. female who presents with Leg Pain (right leg pain, had it drained and steroid injections x 4 months ago)                    Chief Complaint   Patient presents with   • Leg Pain     right leg pain, had it drained and steroid injections x 4 months ago       HPI:  Leo Alarcon is a 36 y.o. female who presents with right leg pain.  Had an abscess drained at this location (near gluteus fold was drained) April of this year.  At that time had a swollen knee and saw her PCP and had knee drained and steroid injection.  Now having gastroc and quad pain.  Right above the anterior knee.  Worse with sitting and standing.  Ok with walking.    No trauma to the knee.  No hx of knee injuries.      Past Medical History:   Diagnosis Date   • ASTHMA     as a child   • S/P tooth extraction        No past surgical history on file.    No family history on file.    Social History     Social History   • Marital status:      Spouse name: N/A   • Number of children: N/A   • Years of education: N/A     Occupational History   • Not on file.     Social History Main Topics   • Smoking status: Current Every Day Smoker     Packs/day: 0.50     Years: 10.00     Types: Cigarettes   • Smokeless tobacco: Never Used      Comment: , nonsmoker   • Alcohol use No   • Drug use: No   • Sexual activity: Yes     Partners: Male     Other Topics Concern   • Not on file     Social History Narrative   • No narrative on file         Current Outpatient Prescriptions:   •  hydrocodone-acetaminophen, 1 Tab, Oral, Q4HRS PRN  •  benzonatate, 200 mg, Oral, TID PRN, 4/24/2017  •  docusate sodium, 100 mg, Oral, BID PRN (Patient not taking: Reported on 4/21/2017), not taking  •  ibuprofen, 900 mg, Oral, Q6HRS PRN, PRN  •  prenatal vit/fe fumarate/fa, 1 Tab, Oral, QAM, 4/27/2014 at Unknown    Allergies   Allergen Reactions   • Allegra-D         Review of Systems   Constitutional: Negative.    HENT: Negative.   "  Eyes: Negative.    Respiratory: Negative.    Cardiovascular: Negative.    Gastrointestinal: Negative.    Genitourinary: Negative.    Musculoskeletal: Positive for joint pain and myalgias.   Skin: Negative.    Neurological: Positive for sensory change.   Endo/Heme/Allergies: Negative.    Psychiatric/Behavioral: Negative.           Objective:     /82   Pulse 89   Temp 36.9 °C (98.4 °F)   Resp 16   Ht 1.626 m (5' 4\")   Wt 74.4 kg (164 lb)   SpO2 100%   BMI 28.15 kg/m²      Physical Exam       Constitutional:  Appropriately groomed, pleasant affect, well nourished, and in no acute distress.    HEENT:  Head: Atraumatic, normocephalic.    Ears:  Hearing grossly intact to voice.    Eyes:  Conjunctivae clear, sclera white, and medial canthus without exudate bilaterally.      Lungs:  Lungs with normal respiratory excursion and effort.      Right Knee:  No swelling, erythema, or ecchymosis present.  Pre-patellar ttp.  Knee stable with varus and valgus stress.      ROM: Extension 0, Flexion 110.   Patella tracking with crepitus.   Patella bilaterally with lateral from midline deformity.  No calf tenderness or clinical evidence of a DVT.      Motor Examination: Quad/hamstring 5/5 without atrophy.     Special Tests: Negative straight leg raise.  Negative bilateral knee valgus and varus stress, McMurrays, and  lachman’s.  Positive patella grind test.    Sensation equal bilaterally to light touch for L4, L5, and S1.      NVS intact, DP 2+.  Capillary refill <2 seconds.        Gait and station wnl, non antalgic.    Derm:  No rashes or lesions with good turgor pressure.     Psychiatric:  Normal judgement, mood and affect.    9/18/2017 10:30 AM    HISTORY/REASON FOR EXAM:  Pain/Deformity Following Trauma.  Bilateral knee pain    TECHNIQUE/EXAM DESCRIPTION AND NUMBER OF VIEWS: Single frontal view of both knee and lateral/sunrise views of the right and left knees standing bilateral knees.    COMPARISON: Lateral and " sunrise views of the right and left knee obtained 9/18/2017    FINDINGS:  There is no fracture.    There is no malalignment.    No degenerative changes are present.   Impression       Negative bilateral knee series   Reading Provider Reading Date   David Henson M.D. Sep 18, 2017   Signing Provider Signing Date Signing Time   David Henson M.D. Sep 18, 2017 10:57 AM            Assessment/Plan:     1. Patellofemoral pain syndrome of right knee  REFERRAL TO SPORTS MEDICINE   2. Anterior knee pain, right  DX-KNEES-AP BILATERAL STANDING    CANCELED: DX-KNEE 3 VIEWS RIGHT      Patient presents with 4-6 months of right knee pain worsened with going upstairs. She was seen by urine or in June and had knee steroid injection and had some improvement from this. On exam today patient has slight lateral from midline deformity of the patellas bilaterally. J tracking with positive crepitus on right. Sunrise view demonstrates slight narrowing of the joint space the right lateral aspect. Place patient in a knee sleeve with patella stabilization referred to sports medicine. Did discuss with patient etiology of patellofemoral syndrome and likelihood of improvement with physical therapy and development of stronger vastus medialis musculature. At this time, recommended icing and ibuprofen.    Patient was in agreement with this treatment plan and seemed to understand without barriers. All questions were encouraged and answered.  Reviewed signs and symptoms of when to seek emergency medical care.     Please note that this dictation was created using voice recognition software.  I have made every reasonable attempt to correct obvious errors, but I expect there are errors of ranjan and possibly content that I did not discover before finalizing the note.

## 2017-09-18 NOTE — LETTER
September 18, 2017         Patient: Leo Alarcon   YOB: 1980   Date of Visit: 9/18/2017           To Whom it May Concern:    Leo Alarcon was seen in my clinic on 9/18/2017.  Please excuse her from work today and tomorrow.    If you have any questions or concerns, please don't hesitate to call.        Sincerely,           Lamin Kathleen P.A.-C.  Electronically Signed

## 2017-09-21 ENCOUNTER — OFFICE VISIT (OUTPATIENT)
Dept: MEDICAL GROUP | Facility: CLINIC | Age: 37
End: 2017-09-21
Payer: COMMERCIAL

## 2017-09-21 VITALS
TEMPERATURE: 98 F | BODY MASS INDEX: 28 KG/M2 | WEIGHT: 164 LBS | DIASTOLIC BLOOD PRESSURE: 82 MMHG | OXYGEN SATURATION: 99 % | SYSTOLIC BLOOD PRESSURE: 122 MMHG | RESPIRATION RATE: 18 BRPM | HEIGHT: 64 IN | HEART RATE: 100 BPM

## 2017-09-21 DIAGNOSIS — M22.2X1 PATELLOFEMORAL PAIN SYNDROME OF RIGHT KNEE: ICD-10-CM

## 2017-09-21 PROCEDURE — 99213 OFFICE O/P EST LOW 20 MIN: CPT | Performed by: FAMILY MEDICINE
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
## 2017-09-21 NOTE — LETTER
September 21, 2017         Patient: Leo Alarcon   YOB: 1980   Date of Visit: 9/21/2017           To Whom it May Concern:    Leo Alarcon was seen in my clinic on 9/21/2017. She may return to work with restriction of no climbing ladders and no squatting until re-evaluation in 2 weeks.    If you have any questions or concerns, please don't hesitate to call.        Sincerely,           Jose Calderon M.D.  Electronically Signed

## 2017-09-21 NOTE — PATIENT INSTRUCTIONS
"You Can Quit Smoking  If you are ready to quit smoking or are thinking about it, congratulations! You have chosen to help yourself be healthier and live longer! There are lots of different ways to quit smoking. Nicotine gum, nicotine patches, a nicotine inhaler, or nicotine nasal spray can help with physical craving. Hypnosis, support groups, and medicines help break the habit of smoking.  TIPS TO GET OFF AND STAY OFF CIGARETTES  · Learn to predict your moods. Do not let a bad situation be your excuse to have a cigarette. Some situations in your life might tempt you to have a cigarette.  · Ask friends and co-workers not to smoke around you.  · Make your home smoke-free.  · Never have \"just one\" cigarette. It leads to wanting another and another. Remind yourself of your decision to quit.  · On a card, make a list of your reasons for not smoking. Read it at least the same number of times a day as you have a cigarette. Tell yourself everyday, \"I do not want to smoke. I choose not to smoke.\"  · Ask someone at home or work to help you with your plan to quit smoking.  · Have something planned after you eat or have a cup of coffee. Take a walk or get other exercise to perk you up. This will help to keep you from overeating.  · Try a relaxation exercise to calm you down and decrease your stress. Remember, you may be tense and nervous the first two weeks after you quit. This will pass.  · Find new activities to keep your hands busy. Play with a pen, coin, or rubber band. Doodle or draw things on paper.  · Brush your teeth right after eating. This will help cut down the craving for the taste of tobacco after meals. You can try mouthwash too.  · Try gum, breath mints, or diet candy to keep something in your mouth.  IF YOU SMOKE AND WANT TO QUIT:  · Do not stock up on cigarettes. Never buy a carton. Wait until one pack is finished before you buy another.  · Never carry cigarettes with you at work or at home.  · Keep cigarettes " "as far away from you as possible. Leave them with someone else.  · Never carry matches or a lighter with you.  · Ask yourself, \"Do I need this cigarette or is this just a reflex?\"  · Bet with someone that you can quit. Put cigarette money in a nextsocial bank every morning. If you smoke, you give up the money. If you do not smoke, by the end of the week, you keep the money.  · Keep trying. It takes 21 days to change a habit!  · Talk to your doctor about using medicines to help you quit. These include nicotine replacement gum, lozenges, or skin patches.     This information is not intended to replace advice given to you by your health care provider. Make sure you discuss any questions you have with your health care provider.     Document Released: 10/14/2010 Document Revised: 03/11/2013 Document Reviewed: 10/14/2010  [employer] Interactive Patient Education ©2016 [employer]    "

## 2017-09-28 ENCOUNTER — HOSPITAL ENCOUNTER (OUTPATIENT)
Dept: PHYSICAL THERAPY | Facility: REHABILITATION | Age: 37
End: 2017-09-28
Attending: ORTHOPAEDIC SURGERY
Payer: COMMERCIAL

## 2017-09-28 PROCEDURE — 97110 THERAPEUTIC EXERCISES: CPT

## 2017-09-28 PROCEDURE — 97161 PT EVAL LOW COMPLEX 20 MIN: CPT

## 2017-10-02 ENCOUNTER — HOSPITAL ENCOUNTER (OUTPATIENT)
Dept: PHYSICAL THERAPY | Facility: REHABILITATION | Age: 37
End: 2017-10-02
Attending: ORTHOPAEDIC SURGERY
Payer: COMMERCIAL

## 2017-10-02 PROCEDURE — 97110 THERAPEUTIC EXERCISES: CPT

## 2017-10-04 ENCOUNTER — HOSPITAL ENCOUNTER (OUTPATIENT)
Dept: PHYSICAL THERAPY | Facility: REHABILITATION | Age: 37
End: 2017-10-04
Attending: ORTHOPAEDIC SURGERY
Payer: COMMERCIAL

## 2017-10-04 PROCEDURE — 97110 THERAPEUTIC EXERCISES: CPT

## 2017-10-06 ENCOUNTER — OFFICE VISIT (OUTPATIENT)
Dept: MEDICAL GROUP | Facility: CLINIC | Age: 37
End: 2017-10-06
Payer: COMMERCIAL

## 2017-10-06 VITALS
BODY MASS INDEX: 28 KG/M2 | OXYGEN SATURATION: 98 % | HEIGHT: 64 IN | RESPIRATION RATE: 18 BRPM | SYSTOLIC BLOOD PRESSURE: 122 MMHG | TEMPERATURE: 98.2 F | WEIGHT: 164 LBS | HEART RATE: 110 BPM | DIASTOLIC BLOOD PRESSURE: 82 MMHG

## 2017-10-06 DIAGNOSIS — M22.2X1 PATELLOFEMORAL PAIN SYNDROME OF RIGHT KNEE: ICD-10-CM

## 2017-10-06 PROCEDURE — 99213 OFFICE O/P EST LOW 20 MIN: CPT | Performed by: FAMILY MEDICINE
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
## 2017-10-10 ENCOUNTER — PHYSICAL THERAPY (OUTPATIENT)
Dept: PHYSICAL THERAPY | Facility: REHABILITATION | Age: 37
End: 2017-10-10
Attending: ORTHOPAEDIC SURGERY
Payer: COMMERCIAL

## 2017-10-10 DIAGNOSIS — M22.2X1 PATELLOFEMORAL DISORDER OF RIGHT KNEE: ICD-10-CM

## 2017-10-10 PROCEDURE — 97110 THERAPEUTIC EXERCISES: CPT

## 2017-10-11 NOTE — OP THERAPY DAILY TREATMENT
Outpatient Physical Therapy  DAILY TREATMENT     [facility] [place]  [address], [address]  [place] [facility] [address]  Phone:  [phone]  Fax:  [phone]    Date: 10/10/2017    Patient: Leo Alarcon  YOB: 1980  MRN: [id number]     Time Calculation  Start time: 1450  Stop time: 1520 Time Calculation (min): 30 minutes     Chief Complaint: Knee Injury    Visit #: 4    Subjective   Patient reports no pain during walking. Continued pain with bending knee and feeling somewhat upset that it hasn't felt better.     Objective   Knee rom WNL, no crepitus noted today with exercises.       Therapeutic Exercises:     1. Bike , level 4, 6 minutes    2. Stepups/ downs, no riser, eccentric step back x 5 min    3. Supine ball curl for hamstring , x 25    4. Wall squats with ball, x25, patient completing wrong way at home with knees with excessive forward translation.     5. Sidelying abduction, 2x20      Assessment/Response/Plan     Patient continues to be able to control painful movement with postioning during all exercises. Decreased forwards translation of knee during squat, sitting, standing important  In reducing overall symptoms.

## 2017-10-12 ENCOUNTER — PHYSICAL THERAPY (OUTPATIENT)
Dept: PHYSICAL THERAPY | Facility: REHABILITATION | Age: 37
End: 2017-10-12
Attending: ORTHOPAEDIC SURGERY
Payer: COMMERCIAL

## 2017-10-12 DIAGNOSIS — M22.2X1 PATELLOFEMORAL DISORDER OF RIGHT KNEE: ICD-10-CM

## 2017-10-12 PROCEDURE — 97110 THERAPEUTIC EXERCISES: CPT

## 2017-10-12 SDOH — ECONOMIC STABILITY: GENERAL: QUALITY OF LIFE: GOOD

## 2017-10-12 ASSESSMENT — ENCOUNTER SYMPTOMS: PAIN SCALE: 2

## 2017-10-13 NOTE — OP THERAPY DAILY TREATMENT
Outpatient Physical Therapy  DAILY TREATMENT     [facility] Out[facility] [place]  [address], [address]  [place] [facility] [address]  Phone:  [phone]  Fax:  [phone]    Date: 10/12/2017    Patient: Leo Alarcon  YOB: 1980  MRN: [id number]     Time Calculation  Start time: 1455  Stop time: 1525 Time Calculation (min): 30 minutes     Chief Complaint: No chief complaint on file.    Visit #: 2    Subjective:   History of Present Illness:     Mechanism of injury:  Pt reports improved pain during day, with increase with transitions from supine/setted to standing. Compliance with HEP.  Quality of life:  Good  Pain:     Current pain ratin  Treatments:     Treatment Comments:  Therex progression and additions.      Objective     Patellar Mobility     Additional Patellar Mobility Details  R patella hypermobility with lateral translation.    Strength:      Additional Strength Details  Pt lacks control of knee valgus with dynamic exercise.        Therapeutic Exercises:     1. SL shuttle     2. Sci fit     3. Bridge with 3 point contact     4. TRX lung with theraband valgus pull     5. Dynamic HS stretch    6. Dynamic quad stretch         Assessment, Response and Plan:   Assessment details:  Pt is quickly fatigued when performing exercise with proper alignment of R knee. Impaired quad and glut med strength lead to increase in valgus with abnormal patella tracking once fatigued. Pt tolerated all therex with min knee pain.  Barriers to therapy:  None    Plan:   Plan details:  Review additions to HEP for compliance and correction if needed. Progress POC as indicated.

## 2017-10-17 ENCOUNTER — PHYSICAL THERAPY (OUTPATIENT)
Dept: PHYSICAL THERAPY | Facility: REHABILITATION | Age: 37
End: 2017-10-17
Attending: ORTHOPAEDIC SURGERY
Payer: COMMERCIAL

## 2017-10-17 DIAGNOSIS — M22.2X1 PATELLOFEMORAL DISORDER OF RIGHT KNEE: ICD-10-CM

## 2017-10-17 PROCEDURE — 97110 THERAPEUTIC EXERCISES: CPT

## 2017-10-17 NOTE — OP THERAPY DAILY TREATMENT
Outpatient Physical Therapy  DAILY TREATMENT     [facility] [place]  [address], [address]  [place] [facility] [address]  Phone:  [phone]  Fax:  [phone]    Date: 10/17/2017    Patient: Leo Alarcon  YOB: 1980  MRN: [id number]     Time Calculation  Start time: 1450  Stop time: 1520 Time Calculation (min): 30 minutes     Chief Complaint: Knee Problem    Visit #: 6    Subjective:   reports extreme difficulty with exerecises covering therapist provided. Otherwise pt reports continuing to feel better but she is going to tell the doctor she wants 2 more weeks off to continue to heal.     Objective  Pt able to tolerate kneeling on soft foam for 4 mins without report of pain while doing leg exercises.       Therapeutic Exercises:     1. Bike  , level 4x 6 min    2. Sidelying leg abduction, review performed x10 each    3. Single leg bridge, 10x    4. Band lateral walks, 15ft x4    5. Wall squats, x30    6. Kneeling fire hydrant, airex pad, 35 each    7. Sidelying lef press, 5c 2x25    8. Therex treatment time: 30 mins      Assessment, Response and Plan:   Pt. improving overall indicated by ability to knee on soft surface. Hip musculature continues to indicate weakness but overall endurance improving with exercises. Pt. able to squat with modified form without any pain.   Plan to continue to address weakness and squat form for improved alignment and decreased pain.

## 2017-10-19 ENCOUNTER — PHYSICAL THERAPY (OUTPATIENT)
Dept: PHYSICAL THERAPY | Facility: REHABILITATION | Age: 37
End: 2017-10-19
Attending: ORTHOPAEDIC SURGERY
Payer: COMMERCIAL

## 2017-10-19 DIAGNOSIS — M22.2X1 PATELLOFEMORAL DISORDER OF RIGHT KNEE: ICD-10-CM

## 2017-10-19 PROCEDURE — 97110 THERAPEUTIC EXERCISES: CPT

## 2017-10-19 NOTE — OP THERAPY DAILY TREATMENT
Outpatient Physical Therapy  DAILY TREATMENT     [facility] Out[facility] [place]  [address]  [place] [address]  Phone:  [phone]  Fax:  [phone]    Date: 10/19/2017    Patient: Leo Alarcon  YOB: 1980  MRN: [id number]     Time Calculation  Start time: 0245  Stop time: 0315 Time Calculation (min): 30 minutes     Chief Complaint: Neck Problem    Visit #: 6    Subjective  Pt reports theraband breaking at home under tension while doing hep. Overall patient reports feeling stronger everyday. With continued reports that she feels if she goes back to work now she will just hurt herself.   Objective    Pt able to squat to chair with eccentric control without reports of pain. No kneeling during session today.       Therapeutic Exercises:     1. Bike  , level 5x 7 min    2. Ball bridges, x20     3. Clamshells, level 3 band, x 4 min    4. Band lateral walks, 15ft x4, level 3    5. Squat to bench, x30    6. Single leg squat to bench, x15 each    7. Sidelying lef press, 5c 2x25    8. Therex treatment time: 30 mins      Assessment/Response/Plan  Pt. improving overall indicated by ability to perform single leg squat without reports of pain.  Plan to continue to address weakness and squat form for improved alignment and decreased pain.

## 2017-10-20 ENCOUNTER — OFFICE VISIT (OUTPATIENT)
Dept: MEDICAL GROUP | Facility: CLINIC | Age: 37
End: 2017-10-20
Payer: COMMERCIAL

## 2017-10-20 VITALS
HEIGHT: 64 IN | OXYGEN SATURATION: 96 % | RESPIRATION RATE: 18 BRPM | SYSTOLIC BLOOD PRESSURE: 124 MMHG | HEART RATE: 110 BPM | BODY MASS INDEX: 28 KG/M2 | TEMPERATURE: 97.9 F | DIASTOLIC BLOOD PRESSURE: 84 MMHG | WEIGHT: 164 LBS

## 2017-10-20 DIAGNOSIS — M22.2X1 PATELLOFEMORAL PAIN SYNDROME OF RIGHT KNEE: ICD-10-CM

## 2017-10-20 PROCEDURE — 99213 OFFICE O/P EST LOW 20 MIN: CPT | Performed by: FAMILY MEDICINE
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
## 2017-10-24 ENCOUNTER — PHYSICAL THERAPY (OUTPATIENT)
Dept: PHYSICAL THERAPY | Facility: REHABILITATION | Age: 37
End: 2017-10-24
Attending: ORTHOPAEDIC SURGERY
Payer: COMMERCIAL

## 2017-10-24 DIAGNOSIS — M22.2X1 PATELLOFEMORAL DISORDER OF RIGHT KNEE: ICD-10-CM

## 2017-10-24 PROCEDURE — 97110 THERAPEUTIC EXERCISES: CPT

## 2017-10-24 NOTE — OP THERAPY DAILY TREATMENT
Outpatient Physical Therapy  DAILY TREATMENT     [facility] Therapy [place]  [address]  [place] [address]  Phone:  [phone]  Fax:  [phone]    Date: 10/24/2017    Patient: Leo Alarcon  YOB: 1980  MRN: [id number]     Time Calculation  Start time: 1450  Stop time: 1520 Time Calculation (min): 30 minutes     Chief Complaint: Knee Problem    Visit #: 6    Subjective  Pt was sick over the weekend. Saw MD and extended work restrictions 2 more weeks.  Overall patient reports feeling stronger everyday.     Objective    Pt able to squat to chair with eccentric control without reports of pain.  No kneeling during session today.valgus knee movement noted with single leg squat.       Therapeutic Exercises:     1. Bike  , level 5x 7 min    2. Ball bridges, 2x20     3. Clamshells, level 3 band, x 4 min    4. Firehydrants, x25 each    5. Squat to bench, x30 doubl leg    6. Single leg squat to bench, x15 each    7. Supine leg press, 8c 2x25    8. Single leg wall abd min squat, x10 each    9. Therex treatment time: 30 mins      Assessment/Response/Plan  Pt. improving overall indicated by ability to perform single leg squat without reports of pain.  Plan to continue to address weakness and squat form for improved alignment and decreased pain.

## 2017-10-26 ENCOUNTER — PHYSICAL THERAPY (OUTPATIENT)
Dept: PHYSICAL THERAPY | Facility: REHABILITATION | Age: 37
End: 2017-10-26
Attending: ORTHOPAEDIC SURGERY
Payer: COMMERCIAL

## 2017-10-26 DIAGNOSIS — M22.2X1 PATELLOFEMORAL DISORDER OF RIGHT KNEE: ICD-10-CM

## 2017-10-26 PROCEDURE — 97110 THERAPEUTIC EXERCISES: CPT

## 2017-10-26 NOTE — OP THERAPY DAILY TREATMENT
Outpatient Physical Therapy  DAILY TREATMENT     [facility] Outpatient Physical Therapy [place]  [address]  [place] [address]  Phone:  [phone]  Fax:  [phone]    Date: 10/26/2017    Patient: Leo Alarcon  YOB: 1980  MRN: [id number]     Time Calculation  Start time: 1445  Stop time: 1515 Time Calculation (min): 30 minutes     Chief Complaint: Knee Problem    Visit #: 6    Subjective  Pt reports no longer having knee pain with sit to stands.  Per last visit: Saw MD and extended work restrictions 2 more weeks.  Overall patient reports feeling stronger everyday. Pain currently 0/10      Objective    Pt able to squat to chair with eccentric control without reports of pain.  No kneeling during session today.valgus knee movement noted with single leg squat.       Therapeutic Exercises:     1. Bike  , level 5x 7 min    2. Ball bridges, 2x20 with band level 3 abduction    3. Clamshells, level 3 band, x 4 min    4. Hip abduction, 5lbsx 15    5. Hip hikes , x35 each    6. Single leg squat to bench, x15 each    7. Supine leg press, 8c 2x25    8. Therex Total treatment time, 30mins      Assessment/Response/Plan  Pt. improving overall indicated by ability to perform single leg squat without reports of pain. Knee valgus appears to be decreasing and is able to be controlled with cuing.   Plan to continue to address weakness and squat form for improved alignment and decreased pain.

## 2017-10-31 ENCOUNTER — PHYSICAL THERAPY (OUTPATIENT)
Dept: PHYSICAL THERAPY | Facility: REHABILITATION | Age: 37
End: 2017-10-31
Attending: ORTHOPAEDIC SURGERY
Payer: COMMERCIAL

## 2017-10-31 DIAGNOSIS — M22.2X1 PATELLOFEMORAL DISORDER OF RIGHT KNEE: ICD-10-CM

## 2017-10-31 PROCEDURE — 97110 THERAPEUTIC EXERCISES: CPT

## 2017-10-31 NOTE — OP THERAPY DAILY TREATMENT
"Outpatient Physical Therapy  DAILY TREATMENT     [facility] Outpatient Physical Therapy [place]  [address]  [place] [facility] [address]  Phone:  [phone]  Fax:  [phone]    Date: 10/31/2017    Patient: Leo Alarcon  YOB: 1980  MRN: [id number]     Time Calculation  Start time: 0245  Stop time: 0315 Time Calculation (min): 30 minutes     Chief Complaint: Knee Injury    Visit #: 10    Subjective  Patient reports pain 0/10 last few days, haven't been doing exercises. \"I've got to get back to worked abigail gained weight.\" Continued nonpainful with sit to stands. Patient plans on returning to work Sunday with MD visit on Friday. Pain currently 0/10    Objective    Pt able to squat to chair with eccentric control without reports of pain. No valgus nee movement noted with squat.      Therapeutic Exercises:     1. Bike  , level 5x 7 min    2. Ball bridges, 2x20 with band level 3 abduction    3. Clamshells, level 3 band, x 4 min    4. Hip abduction, 5lbsx 15, x35 each    6. Single leg squat to bench, x20 each    7. Sidelying leg press, 8c x 35 each leg     8. Therex Total treatment time, 30mins      Assessment/Response/Plan    Pt. improving overall. No reports of pain with exercise, patient decondition overall with exhaustion after each exercise. No knee valgus noted with single leg squats today. Plan to promote Bond with HEP next visit with likely discharge.        "

## 2017-11-02 ENCOUNTER — PHYSICAL THERAPY (OUTPATIENT)
Dept: PHYSICAL THERAPY | Facility: REHABILITATION | Age: 37
End: 2017-11-02
Attending: ORTHOPAEDIC SURGERY

## 2017-11-02 DIAGNOSIS — M22.2X1 PATELLOFEMORAL DISORDER OF RIGHT KNEE: ICD-10-CM

## 2017-11-02 PROCEDURE — 97110 THERAPEUTIC EXERCISES: CPT

## 2017-11-02 NOTE — LETTER
November 2, 2017    Jose Calderon M.D.  [address] [place] [address]      Re:  Leo Alarcon          Dear Dr. Calderon,    It was a pleasure seeing your patient, Leo Alarcon, on 11/2/2017. The encounter diagnosis was Patellofemoral disorder of right knee.     Please find enclosed a copy of this visit encounter which includes discharge summary.      Once again, it was a pleasure participating in your patient's care.  Please feel free to contact me if you have any questions or if I can be of any further assistance to your patients.        Sincerely,      Ottoniel Ward    No Recipients                     Outpatient Physical Therapy  DISCHARGE SUMMARY NOTE      [facility] [address], [address]  [facility] [address]  Phone:  [phone]  Fax:  [phone]    Date of Visit: 11/02/2017    Patient: Leo Alarcon  YOB: 1980  MRN: [id number]     Referring Provider: Jose Calderon M.D.  [address] [place] [address]   Referring Diagnosis Patellofemoral disorders, right knee [M22.2X1]     Visit #: 11    Time Calculation  Start time: 1445  Stop time: 1500 Time Calculation (min): 15 minutes     Physical Therapy Occurrence Codes    Date of onset of impairment:  9/14/17   Date physical therapy care plan established or reviewed:  9/28/17   Date physical therapy treatment started:  9/28/17             Chief Complaint: Knee Problem    Visit Diagnoses     ICD-10-CM   1. Patellofemoral disorder of right knee M22.2X1       Subjective   Patient reports no pain currently and is somewhat eager to return to work. She reports that she will try to obtain knee pads with more comfortable support for when she kneels at work. Otherwise reports with no questions on exercises and is feeling good about this being her last therapy appointment.       Objective   Knee ROM WNL and nonpainful. Observed patient crawling on floor with .5 inch thickness  padding. Kneeling overall for 4 minutes without report of pain. Squats without pain and no osberved medial-lateral movement of knees.       Therapeutic Exercises:     1. Kneeling/lunging to pickup ojects from floor, practice with dynamic work movements for carryover into regular daily/work activities.     2. Crawling technique reviewed, squatting technique, reviewed HEP reviewed.     3. Therex treatment time 15 mins       Assessment/Response/Plan     GOALS:   1. Patient will be Independent with prescribed Home Exercise Program (HEP) and will be able to demonstrate exercises without cues for improved overall symptoms/activity tolerance. GOAL MET  2. Improved quad strength in order to get in and out of chair (4/5 MMT with <2/10 pain). GOAL MET  3. Ascend descend stairs without pain. GOAL MET  4. Kneel with less than 2/10 pain. GOAL MET  5. Extend knee with maximal muscle contraction without pain. GOAL MET   6. Pt. to return to work kneel, squat, stairs with pain <2/10. Pending , otherwise met        Leo Zhang Dorian has completed 11 physical therapy sessions on their current prescription. She has improved function, decreased pain, improved strength, increased ROM, and she continues to progress with her home exercise program. Recommend to discharge patient to full independent home exercise program at this time. Thank you for the opportunity to assist you and your patient.

## 2017-11-02 NOTE — OP THERAPY DISCHARGE SUMMARY
Outpatient Physical Therapy  DISCHARGE SUMMARY NOTE      [facility] [place]  [address], [address]  [place] [address]  Phone:  [phone]  Fax:  [phone]    Date of Visit: 11/02/2017    Patient: Leo Alarcon  YOB: 1980  MRN: [id number]     Referring Provider: Jose Calderon M.D.  [address] [place] [address] [place] [address]   Referring Diagnosis Patellofemoral disorders, right knee [M22.2X1]     Visit #: 11    Time Calculation  Start time: 1445  Stop time: 1500 Time Calculation (min): 15 minutes     Physical Therapy Occurrence Codes    Date of onset of impairment:  9/14/17   Date physical therapy care plan established or reviewed:  9/28/17   Date physical therapy treatment started:  9/28/17             Chief Complaint: Knee Problem    Visit Diagnoses     ICD-10-CM   1. Patellofemoral disorder of right knee M22.2X1       Subjective   Patient reports no pain currently and is somewhat eager to return to work. She reports that she will try to obtain knee pads with more comfortable support for when she kneels at work. Otherwise reports with no questions on exercises and is feeling good about this being her last therapy appointment.       Objective   Knee ROM WNL and nonpainful. Observed patient crawling on floor with .5 inch thickness padding. Kneeling overall for 4 minutes without report of pain. Squats without pain and no osberved medial-lateral movement of knees.       Therapeutic Exercises:     1. Kneeling/lunging to pickup objects from floor, practice with dynamic work movements for carryover into regular daily/work activities.     2. Crawling technique reviewed, squatting technique, reviewed HEP reviewed.     3. Therex treatment time 15 mins       Assessment/Response/Plan     GOALS:   1. Patient will be Independent with prescribed Home Exercise Program (HEP) and will be able to demonstrate exercises without cues for improved overall symptoms/activity tolerance.  GOAL MET  2. Improved quad strength in order to get in and out of chair (4/5 MMT with <2/10 pain). GOAL MET  3. Ascend descend stairs without pain. GOAL MET  4. Kneel with less than 2/10 pain. GOAL MET  5. Extend knee with maximal muscle contraction without pain. GOAL MET   6. Pt. to return to work kneel, squat, stairs with pain <2/10. Pending , otherwise met        Leo Zhang Dorian has completed 11 physical therapy sessions on their current prescription. She has improved function, decreased pain, improved strength, increased ROM, and she continues to progress with her home exercise program. Recommend to discharge patient to full independent home exercise program at this time. Thank you for the opportunity to assist you and your patient.

## 2017-11-03 ENCOUNTER — OFFICE VISIT (OUTPATIENT)
Dept: MEDICAL GROUP | Facility: CLINIC | Age: 37
End: 2017-11-03

## 2017-11-03 VITALS
BODY MASS INDEX: 28 KG/M2 | HEART RATE: 112 BPM | RESPIRATION RATE: 18 BRPM | OXYGEN SATURATION: 95 % | HEIGHT: 64 IN | DIASTOLIC BLOOD PRESSURE: 82 MMHG | SYSTOLIC BLOOD PRESSURE: 122 MMHG | TEMPERATURE: 97.9 F | WEIGHT: 164 LBS

## 2017-11-03 DIAGNOSIS — M22.2X1 PATELLOFEMORAL PAIN SYNDROME OF RIGHT KNEE: ICD-10-CM

## 2017-11-03 PROCEDURE — 99213 OFFICE O/P EST LOW 20 MIN: CPT | Performed by: FAMILY MEDICINE
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
## 2017-11-03 NOTE — LETTER
November 3, 2017         Patient: Leo Alarcon   YOB: 1980   Date of Visit: 11/3/2017           To Whom it May Concern:    Leo Alarcon was seen in my clinic on 11/3/2017. She may return to work without restriction.    If you have any questions or concerns, please don't hesitate to call.        Sincerely,           Jose Calderon M.D.  Electronically Signed

## 2018-02-23 ENCOUNTER — OFFICE VISIT (OUTPATIENT)
Dept: URGENT CARE | Facility: PHYSICIAN GROUP | Age: 38
End: 2018-02-23
Payer: COMMERCIAL

## 2018-02-23 VITALS
DIASTOLIC BLOOD PRESSURE: 80 MMHG | HEIGHT: 64 IN | OXYGEN SATURATION: 96 % | WEIGHT: 180 LBS | SYSTOLIC BLOOD PRESSURE: 118 MMHG | BODY MASS INDEX: 30.73 KG/M2 | TEMPERATURE: 98.7 F | RESPIRATION RATE: 12 BRPM | HEART RATE: 90 BPM

## 2018-02-23 DIAGNOSIS — H69.93 DYSFUNCTION OF BOTH EUSTACHIAN TUBES: ICD-10-CM

## 2018-02-23 DIAGNOSIS — J06.9 ACUTE URI: ICD-10-CM

## 2018-02-23 DIAGNOSIS — J03.90 TONSILLITIS: ICD-10-CM

## 2018-02-23 PROCEDURE — 99214 OFFICE O/P EST MOD 30 MIN: CPT | Performed by: FAMILY MEDICINE

## 2018-02-23 RX ORDER — BUPROPION HYDROCHLORIDE 100 MG/1
100 TABLET, EXTENDED RELEASE ORAL 2 TIMES DAILY
COMMUNITY
End: 2020-03-12

## 2018-02-23 RX ORDER — AMOXICILLIN AND CLAVULANATE POTASSIUM 875; 125 MG/1; MG/1
1 TABLET, FILM COATED ORAL 2 TIMES DAILY
Qty: 20 TAB | Refills: 0 | Status: SHIPPED | OUTPATIENT
Start: 2018-02-23 | End: 2018-03-05

## 2018-02-23 ASSESSMENT — PAIN SCALES - GENERAL: PAINLEVEL: NO PAIN

## 2018-02-23 NOTE — PROGRESS NOTES
HPI: Leo Alarcon is a 37 y.o. female who presents with   Chief Complaint   Patient presents with   • Otalgia     Lt ear pain/headaches [id number]   • Cough     Wet cough x1week       Patient presents to urgent care with one month of symptoms left ear pain and some sinus pressure headaches sore throat no nausea vomiting or diarrhea. No dizziness. He has developed a cough that is mostly dry. She denies getting any nose bleeds. He has not had any shortness of breath or wheezing does not have issues with her asthma      Worsened by: activity, laying supine at night, first thing in the morning, when exposed to outside allergens  Improved by: OTC symptomatic medictions      PMH:  has a past medical history of ASTHMA and S/P tooth extraction. She also has no past medical history of Diabetes or Seizure (CMS-[facility]).  MEDS:   Current Outpatient Prescriptions:   •  buPROPion SR (WELLBUTRIN SR) 100 MG TABLET SR 12 HR, Take 100 mg by mouth 2 times a day., Disp: , Rfl:   •  loratadine-pseudoephedrine (CLARITIN-D 24 HOUR)  MG per tablet, Take 1 Tab by mouth every day., Disp: , Rfl:   •  amoxicillin-clavulanate (AUGMENTIN) 875-125 MG Tab, Take 1 Tab by mouth 2 times a day for 10 days. With food, Disp: 20 Tab, Rfl: 0  •  ibuprofen (MOTRIN) 600 MG TABS, Take 1.5 Tabs by mouth every 6 hours as needed (Cramping)., Disp: 30 Tab, Rfl: 1  •  hydrocodone-acetaminophen (NORCO) 5-325 MG Tab per tablet, Take 1 Tab by mouth every four hours as needed., Disp: 15 Tab, Rfl: 0  •  benzonatate (TESSALON) 200 MG capsule, Take 1 Cap by mouth 3 times a day as needed for Cough., Disp: 40 Cap, Rfl: 0  •  docusate sodium 100 MG CAPS, Take 100 mg by mouth 2 times a day as needed for Constipation. (Patient not taking: Reported on 4/21/2017), Disp: 60 Cap, Rfl: 0  •  prenatal vit/fe fumarate/fa (PRENATAL S) 27-0.8 MG TABS, Take 1 Tab by mouth every morning., Disp: , Rfl:   ALLERGIES:   Allergies   Allergen Reactions   • Allegra-D    SURGHX:  "History reviewed. No pertinent surgical history.  SOCHX:  reports that she has been smoking Cigarettes.  She has a 5.00 pack-year smoking history. She has never used smokeless tobacco. She reports that she does not drink alcohol or use drugs.  FH: Family history was reviewed, no pertinent findings to report    PE:  Vitals /80   Pulse 90   Temp 37.1 °C (98.7 °F)   Resp 12   Ht 1.626 m (5' 4\")   Wt 81.6 kg (180 lb)   SpO2 96%   Breastfeeding? No   BMI 30.90 kg/m²    Gen AOx4, NAD  HEENT: moist mucus membranes, no pain mild pressure with percussion of bilateral maxillary sinuses.  Bilateral conjunciva clear without erythema or exudate,  Bilateral TM's clear without bulge, fluid or loss of landmarks, moderate pharyngeal erythema without tonsillar exudate but with bilatearl tonsillar enlargement present  Neck: supple, no cervical lymphadenopathy, no signs of menigismus  CV/PULM: RRR no murmurs, no rales ronchi or wheezes, no signs of resp distress  Abd soft nontender, bs present  Skin no rashes  Extremities -c/c/e  Neuro appropriate affect,     A/P  1. Acute URI  amoxicillin-clavulanate (AUGMENTIN) 875-125 MG Tab   2. Dysfunction of both eustachian tubes  amoxicillin-clavulanate (AUGMENTIN) 875-125 MG Tab   3. Tonsillitis  amoxicillin-clavulanate (AUGMENTIN) 875-125 MG Tab     Differential diagnosis, natural history, supportive care discussed. Follow-up with primary care provider within 7-10 days, emergency room precautions discussed.  Patient and/or family appears understanding of information.    "

## 2018-09-27 ENCOUNTER — OFFICE VISIT (OUTPATIENT)
Dept: MEDICAL GROUP | Facility: PHYSICIAN GROUP | Age: 38
End: 2018-09-27
Payer: COMMERCIAL

## 2018-09-27 VITALS
HEART RATE: 87 BPM | OXYGEN SATURATION: 97 % | BODY MASS INDEX: 27.99 KG/M2 | RESPIRATION RATE: 16 BRPM | HEIGHT: 65 IN | SYSTOLIC BLOOD PRESSURE: 110 MMHG | WEIGHT: 168 LBS | TEMPERATURE: 98.7 F | DIASTOLIC BLOOD PRESSURE: 68 MMHG

## 2018-09-27 DIAGNOSIS — E66.3 OVERWEIGHT (BMI 25.0-29.9): ICD-10-CM

## 2018-09-27 DIAGNOSIS — M70.41 PREPATELLAR BURSITIS OF RIGHT KNEE: ICD-10-CM

## 2018-09-27 DIAGNOSIS — Z72.0 TOBACCO ABUSE: ICD-10-CM

## 2018-09-27 DIAGNOSIS — Z23 NEED FOR VACCINATION: ICD-10-CM

## 2018-09-27 PROCEDURE — 99213 OFFICE O/P EST LOW 20 MIN: CPT | Mod: 25 | Performed by: FAMILY MEDICINE

## 2018-09-27 PROCEDURE — 90686 IIV4 VACC NO PRSV 0.5 ML IM: CPT | Performed by: FAMILY MEDICINE

## 2018-09-27 PROCEDURE — 90471 IMMUNIZATION ADMIN: CPT | Performed by: FAMILY MEDICINE

## 2018-09-27 NOTE — ASSESSMENT & PLAN NOTE
Currently half pack a day smoker.  Contemplative about quitting.  Currently has nicotine patches at home but has yet to use them.

## 2018-09-27 NOTE — ASSESSMENT & PLAN NOTE
Chronic improving medical condition.  Patient has previously been seen with BMI above 30 but now presents with BMI at 27.  Patient actively working on losing weight, and has a physically demanding job that is facilitating this.

## 2018-09-27 NOTE — PROGRESS NOTES
CC:  Diagnoses of Need for vaccination, Prepatellar bursitis of right knee, Tobacco abuse, and Overweight (BMI 25.0-29.9) were pertinent to this visit.    HISTORY OF THE PRESENT ILLNESS: Patient is a 37 y.o. female. This pleasant patient is here today to establish with me.  Previously seen at [facility].    Health Maintenance: Completed, patient is getting flu shot today.  Scheduling for Pap smear in the near future.      Prepatellar bursitis of right knee  Chronic stable medical condition.  Currently well controlled after steroid injection and aspiration of excess fluid by previous physician.    Tobacco abuse  Currently half pack a day smoker.  Contemplative about quitting.  Currently has nicotine patches at home but has yet to use them.    Overweight (BMI 25.0-29.9)  Chronic improving medical condition.  Patient has previously been seen with BMI above 30 but now presents with BMI at 27.  Patient actively working on losing weight, and has a physically demanding job that is facilitating this.    Allergies: Allegra-d    Current Outpatient Prescriptions Ordered in [facility]   Medication Sig Dispense Refill   • buPROPion SR (WELLBUTRIN SR) 100 MG TABLET SR 12 HR Take 100 mg by mouth 2 times a day.     • loratadine-pseudoephedrine (CLARITIN-D 24 HOUR)  MG per tablet Take 1 Tab by mouth every day.     • hydrocodone-acetaminophen (NORCO) 5-325 MG Tab per tablet Take 1 Tab by mouth every four hours as needed. 15 Tab 0   • benzonatate (TESSALON) 200 MG capsule Take 1 Cap by mouth 3 times a day as needed for Cough. 40 Cap 0   • docusate sodium 100 MG CAPS Take 100 mg by mouth 2 times a day as needed for Constipation. (Patient not taking: Reported on 4/21/2017) 60 Cap 0   • ibuprofen (MOTRIN) 600 MG TABS Take 1.5 Tabs by mouth every 6 hours as needed (Cramping). 30 Tab 1   • prenatal vit/fe fumarate/fa (PRENATAL S) 27-0.8 MG TABS Take 1 Tab by mouth every morning.       No current [facility]-ordered facility-administered  "medications on file.        Past Medical History:   Diagnosis Date   • ASTHMA     as a child   • Bursitis of both knees     Bursitis   • S/P tooth extraction        History reviewed. No pertinent surgical history.    Social History   Substance Use Topics   • Smoking status: Current Every Day Smoker     Packs/day: 0.50     Years: 10.00     Types: Cigarettes   • Smokeless tobacco: Never Used   • Alcohol use No       Social History     Social History Narrative   • No narrative on file       Family History   Problem Relation Age of Onset   • Other Mother         Cirrhosis not from drinking   • Blood Disease Father    • Lung Disease Paternal Aunt         From smoking.   • Breast Cancer Paternal Grandmother    • Diabetes Brother    • Kidney Disease Brother        ROS:   Denies chest pain and shortness of breath        - NOTE: All other systems reviewed and are negative, except as in HPI.        Exam: Blood pressure 110/68, pulse 87, temperature 37.1 °C (98.7 °F), temperature source Temporal, resp. rate 16, height 1.651 m (5' 5\"), weight 76.2 kg (168 lb), SpO2 97 %, not currently breastfeeding. Body mass index is 27.96 kg/m².    GENERAL: No acute distress  HENT: Atraumatic, normocephalic, external auditory canals clear bilaterally, TMs translucent and pearly gray, nares clear without discharge, posterior pharynx clear without erythema or exudates.  EYES: Extraocular movements intact, pupils equal and reactive to light.  NECK: Supple, FROM  CHEST: No deformities, Equal chest expansion, no murmurs, gallops, or rubs.  RESP: Unlabored, no stridor or audible wheeze.  No wheezes, crackles, nor rhonchi  ABD: Soft, Nontender, Non-Distended.  Normal bowel sounds.  No hepatosplenomegaly  Extremities: No Clubbing, Cyanosis, or Edema.  Skin: Warm/dry, without rases  Neuro: A/O x 4, CN 2-12 Grossly intact, Motor/sensory grosly intact  Psych: Normal behavior, normal affect      Assessment/Plan:  1. Need for vaccination  - Influenza " Vaccine Quad Injection >3Y (PF)    2. Prepatellar bursitis of right knee  Chronic stable medical condition.  Minimal swelling and edema noted at the prepatellar bursa of the right knee.  Not bothering her currently    3. Tobacco abuse  Patient is advised to stop using tobacco and tobacco products. We discussed abrupt cessation, nicotine gum or patches, medications such as bupropion or Chantix, and nicotine inhalers including electronic cigarettes.  Approximately 10 minutes was spent with the patient discussing related health consequences of tobacco use, nonpharmacologic and pharmacologic treatment modalities.  Educational handout also provided.    4.  Overweight  Chronic improving medical condition.  Counseled on diet, exercise, and lifestyle changes necessary to continue weight loss.  Patient will follow up with me as needed for additional resources and help in this matter.      Please note that this dictation was created using voice recognition software. I have made every reasonable attempt to correct obvious errors, but I expect that there are errors of grammar and possibly content that I did not discover before finalizing the note.

## 2018-09-27 NOTE — ASSESSMENT & PLAN NOTE
Chronic stable medical condition.  Currently well controlled after steroid injection and aspiration of excess fluid by previous physician.

## 2018-10-25 ENCOUNTER — OFFICE VISIT (OUTPATIENT)
Dept: MEDICAL GROUP | Facility: PHYSICIAN GROUP | Age: 38
End: 2018-10-25
Payer: COMMERCIAL

## 2018-10-25 ENCOUNTER — HOSPITAL ENCOUNTER (OUTPATIENT)
Facility: MEDICAL CENTER | Age: 38
End: 2018-10-25
Attending: FAMILY MEDICINE
Payer: COMMERCIAL

## 2018-10-25 VITALS
HEIGHT: 64 IN | WEIGHT: 165 LBS | DIASTOLIC BLOOD PRESSURE: 68 MMHG | BODY MASS INDEX: 28.17 KG/M2 | SYSTOLIC BLOOD PRESSURE: 108 MMHG | OXYGEN SATURATION: 100 % | HEART RATE: 86 BPM

## 2018-10-25 DIAGNOSIS — Z01.419 WELL WOMAN EXAM: ICD-10-CM

## 2018-10-25 PROCEDURE — 99395 PREV VISIT EST AGE 18-39: CPT | Performed by: FAMILY MEDICINE

## 2018-10-25 PROCEDURE — 87624 HPV HI-RISK TYP POOLED RSLT: CPT

## 2018-10-25 PROCEDURE — 88175 CYTOPATH C/V AUTO FLUID REDO: CPT

## 2018-10-25 ASSESSMENT — PATIENT HEALTH QUESTIONNAIRE - PHQ9: CLINICAL INTERPRETATION OF PHQ2 SCORE: 0

## 2018-10-25 ASSESSMENT — PAIN SCALES - GENERAL: PAINLEVEL: NO PAIN

## 2018-10-25 NOTE — PROGRESS NOTES
Leo Alarcon is a 37 y.o. y.o. female who presents for her Gynecologic Exam        HPI Comments: Pt presents for well woman exam. Pt has no complaints.   Difficult to determine last menstrual period due to irregular spotting from IUD.  Review of Systems   Pertinent positives documented in HPI and all other systems reviewed & are negative    All PMH, PSH, allergies, social history and FH reviewed and updated today:  Past Medical History:   Diagnosis Date   • ASTHMA     as a child   • Bursitis of both knees     Bursitis   • S/P tooth extraction      No past surgical history on file.  Allegra-d  Social History     Social History   • Marital status:      Spouse name: N/A   • Number of children: N/A   • Years of education: N/A     Social History Main Topics   • Smoking status: Current Every Day Smoker     Packs/day: 0.50     Years: 10.00     Types: Cigarettes   • Smokeless tobacco: Never Used   • Alcohol use No   • Drug use: No   • Sexual activity: Yes     Partners: Male     Other Topics Concern   • Not on file     Social History Narrative   • No narrative on file     Family History   Problem Relation Age of Onset   • Other Mother         Cirrhosis not from drinking   • Blood Disease Father    • Lung Disease Paternal Aunt         From smoking.   • Breast Cancer Paternal Grandmother    • Diabetes Brother    • Kidney Disease Brother      Medications:   Current Outpatient Prescriptions Ordered in [facility]   Medication Sig Dispense Refill   • buPROPion SR (WELLBUTRIN SR) 100 MG TABLET SR 12 HR Take 100 mg by mouth 2 times a day.     • loratadine-pseudoephedrine (CLARITIN-D 24 HOUR)  MG per tablet Take 1 Tab by mouth every day.     • hydrocodone-acetaminophen (NORCO) 5-325 MG Tab per tablet Take 1 Tab by mouth every four hours as needed. 15 Tab 0   • benzonatate (TESSALON) 200 MG capsule Take 1 Cap by mouth 3 times a day as needed for Cough. 40 Cap 0   • docusate sodium 100 MG CAPS Take 100 mg by mouth 2  "times a day as needed for Constipation. (Patient not taking: Reported on 4/21/2017) 60 Cap 0   • ibuprofen (MOTRIN) 600 MG TABS Take 1.5 Tabs by mouth every 6 hours as needed (Cramping). 30 Tab 1   • prenatal vit/fe fumarate/fa (PRENATAL S) 27-0.8 MG TABS Take 1 Tab by mouth every morning.       No current [facility]-ordered facility-administered medications on file.           Objective:   Vital measurements:  Blood pressure 108/68, pulse 86, height 1.626 m (5' 4\"), weight 74.8 kg (165 lb), SpO2 100 %, not currently breastfeeding.  Body mass index is 28.32 kg/m². (Goal BM I>18 <25)    Physical Exam   Nursing note and vitals reviewed.  Constitutional: She is oriented to person, place, and time. She appears well-developed and well-nourished. No distress.     HEENT:   Head: Normocephalic and atraumatic.   Right Ear: External ear normal.   Left Ear: External ear normal.   Nose: Nose normal.   Eyes: Conjunctivae and EOM are normal. Pupils are equal, round, and reactive to light. No scleral icterus.     Neck: Normal range of motion. Neck supple. No tracheal deviation present. No thyromegaly present.     Pulmonary/Chest: Effort normal and breath sounds normal. No respiratory distress. She has no wheezes. She has no rales. She exhibits no tenderness.     Cardiovascular: Regular, rate and rhythm. No JVD.    Abdominal: Soft. Bowel sounds are normal. She exhibits no distension and no mass. No tenderness. She has no rebound and no guarding.     Breast:  negative    Genitourinary:  Pelvic exam was performed with patient supine.  External genitalia with no abnormal pigmentation, labial fusion,rash, tenderness, lesion or injury to the labia bilaterally.  Vagina is moist with no lesions, foul discharge, erythema, tenderness or bleeding. No foreign body around the vagina or signs of injury.   Cervix exhibits no motion tenderness, no discharge and no friability.  IUD strings visible coming through the cervical loss.      Musculoskeletal: " Normal range of motion. She exhibits no edema and no tenderness.     Lymphadenopathy: She has no cervical adenopathy.     Neurological: She is alert and oriented to person, place, and time. She exhibits normal muscle tone.     Skin: Skin is warm and dry. No rash noted. She is not diaphoretic. No erythema. No pallor.     Psychiatric: She has a normal mood and affect. Her behavior is normal. Judgment and thought content normal.               Assessment:     1. Well woman exam           Plan:   Pap and physical exam performed  Monthly SBE.  Counseling: breast self exam and mammography screening  Encourage exercise and proper diet.  Mammograms starting @ age 50 biannually.  See medications and orders placed in encounter report.

## 2018-10-30 DIAGNOSIS — Z01.419 WELL WOMAN EXAM: ICD-10-CM

## 2018-10-31 LAB
CYTOLOGY REG CYTOL: NORMAL
HPV HR 12 DNA CVX QL NAA+PROBE: NEGATIVE
HPV16 DNA SPEC QL NAA+PROBE: NEGATIVE
HPV18 DNA SPEC QL NAA+PROBE: NEGATIVE
SPECIMEN SOURCE: NORMAL

## 2019-01-24 ENCOUNTER — OFFICE VISIT (OUTPATIENT)
Dept: URGENT CARE | Facility: PHYSICIAN GROUP | Age: 39
End: 2019-01-24
Payer: COMMERCIAL

## 2019-01-24 VITALS
SYSTOLIC BLOOD PRESSURE: 112 MMHG | OXYGEN SATURATION: 98 % | DIASTOLIC BLOOD PRESSURE: 76 MMHG | HEIGHT: 64 IN | HEART RATE: 91 BPM | RESPIRATION RATE: 14 BRPM | TEMPERATURE: 97.9 F | BODY MASS INDEX: 28.17 KG/M2 | WEIGHT: 165 LBS

## 2019-01-24 DIAGNOSIS — R07.89 CHEST DISCOMFORT: ICD-10-CM

## 2019-01-24 PROCEDURE — 99214 OFFICE O/P EST MOD 30 MIN: CPT | Performed by: FAMILY MEDICINE

## 2019-01-24 RX ORDER — MELOXICAM 7.5 MG/1
7.5 TABLET ORAL DAILY
Qty: 7 TAB | Refills: 1 | Status: SHIPPED | OUTPATIENT
Start: 2019-01-24 | End: 2019-01-31

## 2019-01-24 RX ORDER — KETOROLAC TROMETHAMINE 30 MG/ML
60 INJECTION, SOLUTION INTRAMUSCULAR; INTRAVENOUS ONCE
Status: COMPLETED | OUTPATIENT
Start: 2019-01-24 | End: 2019-01-24

## 2019-01-24 RX ADMIN — KETOROLAC TROMETHAMINE 60 MG: 30 INJECTION, SOLUTION INTRAMUSCULAR; INTRAVENOUS at 10:00

## 2019-01-24 ASSESSMENT — ENCOUNTER SYMPTOMS
HEMOPTYSIS: 0
CHILLS: 0
FEVER: 0
DIZZINESS: 0
FOCAL WEAKNESS: 0
SHORTNESS OF BREATH: 0
ORTHOPNEA: 0

## 2019-01-24 ASSESSMENT — PAIN SCALES - GENERAL: PAINLEVEL: 5=MODERATE PAIN

## 2019-01-24 NOTE — PROGRESS NOTES
"Subjective:      Leo Alarcon is a 38 y.o. female who presents with Pain (x 5 days// L side chest pain// sharp and dull pain// pt states poss. pulled muscle )      - This is a very pleasant, well and non-toxic appearing 38 y.o. female with complaints of sharp pain lower Lt ribs x 5 days. Worse w/ bending forward and certain positions, no pain if up straight. No NVFC, no cough or diaphoresis or exertional symptoms           ALLERGIES:  Allegra-d     PMH:  Past Medical History:   Diagnosis Date   • ASTHMA     as a child   • Bursitis of both knees     Bursitis   • S/P tooth extraction         PSH:  History reviewed. No pertinent surgical history.    MEDS:    Current Outpatient Prescriptions:   •  meloxicam (MOBIC) 7.5 MG Tab, Take 1 Tab by mouth every day for 7 days., Disp: 7 Tab, Rfl: 1  •  buPROPion SR (WELLBUTRIN SR) 100 MG TABLET SR 12 HR, Take 100 mg by mouth 2 times a day., Disp: , Rfl:   •  loratadine-pseudoephedrine (CLARITIN-D 24 HOUR)  MG per tablet, Take 1 Tab by mouth every day., Disp: , Rfl:   •  prenatal vit/fe fumarate/fa (PRENATAL S) 27-0.8 MG TABS, Take 1 Tab by mouth every morning., Disp: , Rfl:     Current Facility-Administered Medications:   •  ketorolac (TORADOL) injection 60 mg, 60 mg, Intramuscular, Once, Mark Santamaria M.D.    ** I have documented what I find to be significant in regards to past medical, social, family and surgical history  in my HPI or under PMH/PSH/FH review section, otherwise it is contributory **           HPI    Review of Systems   Constitutional: Negative for chills and fever.   Respiratory: Negative for hemoptysis and shortness of breath.    Cardiovascular: Positive for chest pain ( rib ). Negative for orthopnea.   Neurological: Negative for dizziness and focal weakness.          Objective:     /76   Pulse 91   Temp 36.6 °C (97.9 °F) (Temporal)   Resp 14   Ht 1.626 m (5' 4\")   Wt 74.8 kg (165 lb)   SpO2 98%   BMI 28.32 kg/m²      Physical " Exam   Constitutional: She appears well-developed. No distress.   HENT:   Head: Normocephalic and atraumatic.   Mouth/Throat: Oropharynx is clear and moist.   Eyes: Conjunctivae are normal.   Neck: Neck supple.   Cardiovascular: Regular rhythm.    No murmur heard.  Pulmonary/Chest: Effort normal and breath sounds normal. No respiratory distress.       Neurological: She is alert. She exhibits normal muscle tone.   Skin: Skin is warm and dry.   Psychiatric: She has a normal mood and affect. Judgment normal.   Nursing note and vitals reviewed.  + TTP            Assessment/Plan:         1. Chest discomfort  ketorolac (TORADOL) injection 60 mg    meloxicam (MOBIC) 7.5 MG Tab       * seems MSK      Dx & d/c instructions discussed w/ patient and/or family members.     ER precautions (worsening signs symptoms and when to go to ER) discussed.    Follow up w/ PCP in 2-3 days to make sure symptoms improving and no further intervention/treatment and/or work-up needed was advised, ER if feeling worse or not improving in 2 days.    Possible side effects (i.e. Rash, GI upset/constipation, sedation, elevation of BP or sugars) of any medications given discussed.     Patient left in stable condition

## 2019-10-04 ENCOUNTER — OFFICE VISIT (OUTPATIENT)
Dept: MEDICAL GROUP | Facility: PHYSICIAN GROUP | Age: 39
End: 2019-10-04
Payer: COMMERCIAL

## 2019-10-04 VITALS
DIASTOLIC BLOOD PRESSURE: 74 MMHG | HEART RATE: 94 BPM | HEIGHT: 64 IN | OXYGEN SATURATION: 98 % | BODY MASS INDEX: 29.37 KG/M2 | WEIGHT: 172 LBS | TEMPERATURE: 97.8 F | SYSTOLIC BLOOD PRESSURE: 110 MMHG

## 2019-10-04 DIAGNOSIS — Z23 NEED FOR VACCINATION: ICD-10-CM

## 2019-10-04 DIAGNOSIS — N63.0 BREAST LUMP: ICD-10-CM

## 2019-10-04 PROCEDURE — 90471 IMMUNIZATION ADMIN: CPT | Performed by: FAMILY MEDICINE

## 2019-10-04 PROCEDURE — 99214 OFFICE O/P EST MOD 30 MIN: CPT | Mod: 25 | Performed by: FAMILY MEDICINE

## 2019-10-04 PROCEDURE — 90686 IIV4 VACC NO PRSV 0.5 ML IM: CPT | Performed by: FAMILY MEDICINE

## 2019-10-04 ASSESSMENT — PATIENT HEALTH QUESTIONNAIRE - PHQ9: CLINICAL INTERPRETATION OF PHQ2 SCORE: 0

## 2019-10-04 ASSESSMENT — PAIN SCALES - GENERAL: PAINLEVEL: NO PAIN

## 2019-10-05 NOTE — ASSESSMENT & PLAN NOTE
New problem to examiner.  Patient presents with 1 week of noticeable breast lump.  Initially started as itchy spot at the 12 o'clock position above the areola.  Itching has improved however the spot has become painful and she states that there is a small lump directly underneath the area of itching which now has a red raised brown sadaf on it.  Denies any nipple discharge.  Positive remote family history of breast cancer.  No personal history of breast cancer previously.

## 2019-10-05 NOTE — PROGRESS NOTES
CC:Diagnoses of Need for vaccination and Breast lump were pertinent to this visit.      HISTORY OF PRESENT ILLNESS: Patient is a 38 y.o. female established patient who presents today to evaluate breast lump.    Health Maintenance: Flu shot given today    Breast lump  New problem to examiner.  Patient presents with 1 week of noticeable breast lump.  Initially started as itchy spot at the 12 o'clock position above the areola.  Itching has improved however the spot has become painful and she states that there is a small lump directly underneath the area of itching which now has a red raised brown sadaf on it.  Denies any nipple discharge.  Positive remote family history of breast cancer.  No personal history of breast cancer previously.      Patient Active Problem List    Diagnosis Date Noted   • Breast lump 10/04/2019   • Well woman exam 10/25/2018   • Prepatellar bursitis of right knee 09/27/2018   • Overweight (BMI 25.0-29.9) 09/27/2018   • Tobacco abuse 03/12/2017      Allergies:Allegra-d    Current Outpatient Medications   Medication Sig Dispense Refill   • buPROPion SR (WELLBUTRIN SR) 100 MG TABLET SR 12 HR Take 100 mg by mouth 2 times a day.       No current facility-administered medications for this visit.        Social History     Tobacco Use   • Smoking status: Current Every Day Smoker     Packs/day: 0.50     Years: 10.00     Pack years: 5.00     Types: Cigarettes   • Smokeless tobacco: Never Used   Substance Use Topics   • Alcohol use: No   • Drug use: No     Social History     Social History Narrative   • Not on file       Family History   Problem Relation Age of Onset   • Other Mother         Cirrhosis not from drinking   • Blood Disease Father    • Lung Disease Paternal Aunt         From smoking.   • Breast Cancer Paternal Grandmother    • Diabetes Brother    • Kidney Disease Brother        Review of Systems:    Constitutional: No Fevers, Chills  Eyes: No vision changes  ENT: No hearing changes  Resp: No  "Shortness of breath  CV: No Chest pain  GI: No Nausea/Vomiting  MSK: No weakness  All remaining systems reviewed and found to be negative, except as stated above.    Exam:    /74 (BP Location: Left arm, Patient Position: Sitting, BP Cuff Size: Adult)   Pulse 94   Temp 36.6 °C (97.8 °F)   Ht 1.626 m (5' 4\")   Wt 78 kg (172 lb)   SpO2 98%  Body mass index is 29.52 kg/m².    General:  Well nourished, well developed female in NAD  HENT: Atraumatic, normocephalic  EYES: Extraocular movements intact, pupils equal and reactive to light  NECK: Supple, FROM  CHEST: No deformities, Equal chest expansion  Clinical breast exam: Bilateral fibrocystic breast tissue without lymphadenopathy.  No palpable lump in area patient described.  Nonpainful to palpation today.  RESP: Unlabored, no stridor or audible wheeze  ABD: Soft, Nontender, Non-Distended  Extremities: No Clubbing, Cyanosis, or Edema  Skin: Warm/dry, without rashes.  Small reddish-brown area at the 12 o'clock position to the nipple consistent with excoriation approximately 0.5 x 0.5 cm in size.  Neuro: A/O x 4, CN 2-12 Grossly intact, Motor/sensory grossly intact  Psych: Normal behavior, normal affect      Assessment/Plan:  1. Need for vaccination  - Influenza Vaccine Quad Injection (PF)    2. Breast lump  New problem to examiner with unknown prognosis.  Breast ultrasound as below.  Follow-up pending results.  - US-BREAST LIMITED-LEFT; Future      Follow-up PRN    Please note that this dictation was created using voice recognition software. I have worked with consultants from the vendor as well as technical experts from [email or web address] to optimize the interface. I have made every reasonable attempt to correct obvious errors, but I expect that there are errors of grammar and possibly content that I did not discover before finalizing the note.  "

## 2019-10-18 ENCOUNTER — HOSPITAL ENCOUNTER (OUTPATIENT)
Dept: RADIOLOGY | Facility: MEDICAL CENTER | Age: 39
End: 2019-10-18
Attending: FAMILY MEDICINE
Payer: COMMERCIAL

## 2019-10-18 ENCOUNTER — TELEPHONE (OUTPATIENT)
Dept: MEDICAL GROUP | Facility: PHYSICIAN GROUP | Age: 39
End: 2019-10-18

## 2019-10-18 DIAGNOSIS — N63.0 BREAST LUMP: ICD-10-CM

## 2019-10-18 PROCEDURE — 76642 ULTRASOUND BREAST LIMITED: CPT | Mod: LT

## 2019-10-18 PROCEDURE — G0279 TOMOSYNTHESIS, MAMMO: HCPCS

## 2019-10-24 ENCOUNTER — OFFICE VISIT (OUTPATIENT)
Dept: MEDICAL GROUP | Facility: PHYSICIAN GROUP | Age: 39
End: 2019-10-24
Payer: COMMERCIAL

## 2019-10-24 VITALS
HEIGHT: 64 IN | BODY MASS INDEX: 28.85 KG/M2 | OXYGEN SATURATION: 97 % | WEIGHT: 169 LBS | TEMPERATURE: 97.8 F | SYSTOLIC BLOOD PRESSURE: 110 MMHG | HEART RATE: 74 BPM | DIASTOLIC BLOOD PRESSURE: 64 MMHG

## 2019-10-24 DIAGNOSIS — N60.02 BREAST CYST, LEFT: ICD-10-CM

## 2019-10-24 PROCEDURE — 99213 OFFICE O/P EST LOW 20 MIN: CPT | Performed by: FAMILY MEDICINE

## 2019-10-24 ASSESSMENT — PAIN SCALES - GENERAL: PAINLEVEL: NO PAIN

## 2019-10-24 NOTE — ASSESSMENT & PLAN NOTE
Chronic ongoing medical condition.  Recent mammogram and breast ultrasound demonstrate 7.5 mm cyst BI-RADS 3 score.  Symptoms relatively unchanged and patient here to review breast ultrasound today.

## 2019-10-24 NOTE — PROGRESS NOTES
"CC:The encounter diagnosis was Breast cyst, left.      HISTORY OF PRESENT ILLNESS: Patient is a 38 y.o. female established patient who presents today to review breast ultrasound and mammogram results.    Breast cyst, left  Chronic ongoing medical condition.  Recent mammogram and breast ultrasound demonstrate 7.5 mm cyst BI-RADS 3 score.  Symptoms relatively unchanged and patient here to review breast ultrasound today.      Patient Active Problem List    Diagnosis Date Noted   • Breast cyst, left 10/04/2019   • Well woman exam 10/25/2018   • Prepatellar bursitis of right knee 09/27/2018   • Overweight (BMI 25.0-29.9) 09/27/2018   • Tobacco abuse 03/12/2017      Allergies:Allegra-d    Current Outpatient Medications   Medication Sig Dispense Refill   • buPROPion SR (WELLBUTRIN SR) 100 MG TABLET SR 12 HR Take 100 mg by mouth 2 times a day.       No current facility-administered medications for this visit.        Social History     Tobacco Use   • Smoking status: Current Every Day Smoker     Packs/day: 0.50     Years: 10.00     Pack years: 5.00     Types: Cigarettes   • Smokeless tobacco: Never Used   Substance Use Topics   • Alcohol use: No   • Drug use: No     Social History     Social History Narrative   • Not on file       Family History   Problem Relation Age of Onset   • Other Mother         Cirrhosis not from drinking   • Blood Disease Father    • Lung Disease Paternal Aunt         From smoking.   • Breast Cancer Paternal Grandmother    • Diabetes Brother    • Kidney Disease Brother        Review of Systems:    Constitutional: No Fevers, Chills  Resp: No Shortness of breath      Exam:    /64 (BP Location: Left arm, Patient Position: Sitting, BP Cuff Size: Adult)   Pulse 74   Temp 36.6 °C (97.8 °F)   Ht 1.626 m (5' 4\")   Wt 76.7 kg (169 lb)   SpO2 97%  Body mass index is 29.01 kg/m².    General:  Well nourished, well developed female in NAD  HENT: Atraumatic, normocephalic  EYES: Extraocular movements " intact, pupils equal and reactive to light  NECK: Supple, FROM  CHEST: No deformities, Equal chest expansion  RESP: Unlabored, no stridor or audible wheeze  ABD: Soft, Nontender, Non-Distended  Extremities: No Clubbing, Cyanosis, or Edema  Skin: Warm/dry, without rashes  Neuro: A/O x 4, CN 2-12 Grossly intact, Motor/sensory grossly intact  Psych: Normal behavior, normal affect      Breast mammogram and breast ultrasound results reviewed    Assessment/Plan:  1. Breast cyst, left  Chronic ongoing medical condition.  Follow-up breast ultrasound in 6 months.    Patient was seen for 15 minutes face to face of which, 10 minutes was spent counseling, and answering questions regarding the above mentioned problems.    Please note that this dictation was created using voice recognition software. I have worked with consultants from the vendor as well as technical experts from [facility] [employer] to optimize the interface. I have made every reasonable attempt to correct obvious errors, but I expect that there are errors of grammar and possibly content that I did not discover before finalizing the note.

## 2020-02-25 ENCOUNTER — OFFICE VISIT (OUTPATIENT)
Dept: URGENT CARE | Facility: PHYSICIAN GROUP | Age: 40
End: 2020-02-25
Payer: COMMERCIAL

## 2020-02-25 VITALS
WEIGHT: 180 LBS | OXYGEN SATURATION: 99 % | HEIGHT: 65 IN | HEART RATE: 83 BPM | TEMPERATURE: 99.2 F | DIASTOLIC BLOOD PRESSURE: 100 MMHG | RESPIRATION RATE: 15 BRPM | BODY MASS INDEX: 29.99 KG/M2 | SYSTOLIC BLOOD PRESSURE: 138 MMHG

## 2020-02-25 DIAGNOSIS — R11.0 NAUSEA: ICD-10-CM

## 2020-02-25 DIAGNOSIS — S16.1XXA STRAIN OF NECK MUSCLE, INITIAL ENCOUNTER: ICD-10-CM

## 2020-02-25 PROCEDURE — 99214 OFFICE O/P EST MOD 30 MIN: CPT | Performed by: PHYSICIAN ASSISTANT

## 2020-02-25 RX ORDER — METHYLPREDNISOLONE 4 MG/1
TABLET ORAL
Qty: 21 TAB | Refills: 0 | Status: SHIPPED | OUTPATIENT
Start: 2020-02-25 | End: 2020-03-12

## 2020-02-25 RX ORDER — CYCLOBENZAPRINE HCL 5 MG
5-10 TABLET ORAL 3 TIMES DAILY PRN
Qty: 30 TAB | Refills: 0 | Status: SHIPPED | OUTPATIENT
Start: 2020-02-25 | End: 2020-03-12

## 2020-02-25 RX ORDER — ONDANSETRON 4 MG/1
4 TABLET, FILM COATED ORAL EVERY 6 HOURS PRN
Qty: 20 TAB | Refills: 1 | Status: SHIPPED | OUTPATIENT
Start: 2020-02-25 | End: 2020-03-12

## 2020-02-25 ASSESSMENT — ENCOUNTER SYMPTOMS
FOCAL WEAKNESS: 0
VOMITING: 0
ABDOMINAL PAIN: 0
NAUSEA: 1
WHEEZING: 0
BACK PAIN: 0
DIARRHEA: 0
SPUTUM PRODUCTION: 0
NECK PAIN: 1
SHORTNESS OF BREATH: 0
FEVER: 0
COUGH: 0
TINGLING: 0
CHILLS: 0
SENSORY CHANGE: 0

## 2020-02-25 ASSESSMENT — PAIN SCALES - GENERAL: PAINLEVEL: 10=SEVERE PAIN

## 2020-02-25 NOTE — LETTER
February 25, 2020       Patient: Leo Alarcon   YOB: 1980   Date of Visit: 2/25/2020         To Whom It May Concern:    It is my medical opinion that Leo Alarcon should be excused from work for today and tomorrow due to illness.        If you have any questions or concerns, please don't hesitate to call [phone]          Sincerely,          Stefan Summers P.A.-C.  Electronically Signed

## 2020-02-25 NOTE — PROGRESS NOTES
"Subjective:   Leo Alarcon  is a 39 y.o. female who presents for Neck Pain (Right side, nausea. Onset yesterday)        Pt notes right sided neck pain that she woke up with somewhat out of the blue yesterday afternoon.  Denies trauma or injury.  Denies fevers chills or feeling sick.  Notes pain is spastic in nature right-sided neck yesterday.  Denies vomiting but noted episode of nausea associated.  Denies abdominal pain diarrhea or rash.  Denies dysuria frequency urgency or hematuria.  Denies diarrhea constipation blood per stool or melena.  Denies past medical history of similar.  Patient requests work note time to rest today.  She states pain was \"intense\".  Did not trial ice heat or any treatments.  Patient was free range of motion of head and neck through history taking.    Review of Systems   Constitutional: Negative for chills and fever.   Respiratory: Negative for cough, sputum production, shortness of breath and wheezing.    Gastrointestinal: Positive for nausea. Negative for abdominal pain, diarrhea and vomiting.   Musculoskeletal: Positive for neck pain. Negative for back pain.   Skin: Negative for rash.   Neurological: Negative for tingling, sensory change and focal weakness.     Allergies   Allergen Reactions   • Allegra-D       Objective:   /100   Pulse 83   Temp 37.3 °C (99.2 °F)   Resp 15   Ht 1.651 m (5' 5\")   Wt 81.6 kg (180 lb)   SpO2 99%   BMI 29.95 kg/m²   Physical Exam  Vitals signs and nursing note reviewed.   Constitutional:       General: She is not in acute distress.     Appearance: She is well-developed. She is not diaphoretic.   HENT:      Head: Normocephalic and atraumatic.      Right Ear: External ear normal.      Left Ear: External ear normal.      Nose: Nose normal.   Eyes:      General: Lids are normal. No scleral icterus.        Right eye: No discharge.         Left eye: No discharge.      Conjunctiva/sclera: Conjunctivae normal.   Neck:      Musculoskeletal: " Full passive range of motion without pain, normal range of motion and neck supple. Normal range of motion. Muscular tenderness present. No edema, erythema, neck rigidity, crepitus, injury, pain with movement, torticollis ( no but gestures to sternocleidomastoid ) or spinous process tenderness.      Trachea: Trachea and phonation normal.      Meningeal: Brudzinski's sign and Kernig's sign absent.        Comments: Normal exam  Pulmonary:      Effort: Pulmonary effort is normal. No respiratory distress.   Musculoskeletal: Normal range of motion.   Lymphadenopathy:      Cervical:      Right cervical: No superficial cervical adenopathy.     Left cervical: No superficial cervical adenopathy.   Skin:     General: Skin is warm and dry.      Coloration: Skin is not pale.      Findings: No erythema.   Neurological:      Mental Status: She is alert and oriented to person, place, and time. She is not disoriented.   Psychiatric:         Speech: Speech normal.         Behavior: Behavior normal.           Assessment/Plan:   1. Strain of neck muscle, initial encounter  - methylPREDNISolone (MEDROL DOSEPAK) 4 MG Tablet Therapy Pack; Follow schedule on package instructions.  Dispense: 21 Tab; Refill: 0  - cyclobenzaprine (FLEXERIL) 5 MG tablet; Take 1-2 Tabs by mouth 3 times a day as needed for Moderate Pain.  Dispense: 30 Tab; Refill: 0    2. Nausea  - ondansetron (ZOFRAN) 4 MG Tab tablet; Take 1 Tab by mouth every 6 hours as needed for Nausea/Vomiting.  Dispense: 20 Tab; Refill: 1  Recommend conservative care, rest, ice, elevation, work on gentle ROM exercises, unclear etiology of pain and nausea but normal vitals and exam, sent with Zofran, Medrol and Flexeril, Cautioned regarding potential for sedation with medication.  Cautioned regarding potential side effects of steroid, avoid nsaids while using    Sent with work note per patient request.  Differential diagnosis, natural history, supportive care, and indications for immediate  follow-up discussed.

## 2020-02-28 ENCOUNTER — TELEPHONE (OUTPATIENT)
Dept: MEDICAL GROUP | Facility: PHYSICIAN GROUP | Age: 40
End: 2020-02-28

## 2020-02-28 NOTE — TELEPHONE ENCOUNTER
1. Caller Name: Leo Alarcon                          Call Back Number: [phone] (home)       How would the patient prefer to be contacted with a response: Phone call OK to leave a detailed message    Leo would like to know if you can send her in a script for her patch to quit smoking. She said this is something you have sent her in the past.

## 2020-03-09 NOTE — MR AVS SNAPSHOT
"        Leo Vicente Allalo   2017 9:45 AM   Office Visit   MRN: [id number]    Department:  [facility]   Dept Phone:  [phone]    Description:  Female : 1980   Provider:  Leo Josue PA-C           Reason for Visit     Wound Check           Allergies as of 2017     Allergen Noted Reactions    Allegra-D 2011         You were diagnosed with     Wound check, abscess   [[id number]]         Vital Signs     Blood Pressure Pulse Temperature Respirations Height Weight    122/82 mmHg 90 37 °C (98.6 °F) 18 1.626 m (5' 4.02\") 72.576 kg (160 lb)    Body Mass Index Oxygen Saturation Smoking Status             27.45 kg/m2 97% Current Every Day Smoker         Basic Information     Date Of Birth Sex Race Ethnicity Preferred Language    1980 Female Other, White Non- English      Problem List              ICD-10-CM Priority Class Noted - Resolved    Labor and delivery indication for care or intervention O75.9   2014 - Present    Polyhydramnios O40.9XX0   2014 - Present    Smoker F17.200   3/12/2017 - Present      Health Maintenance        Date Due Completion Dates    PAP SMEAR 2001 ---    IMM DTaP/Tdap/Td Vaccine (2 - Td) 2024            Current Immunizations     Pneumococcal polysaccharide vaccine (PPSV-23) 2014 10:25 PM    Tdap Vaccine 2014  4:15 PM      Below and/or attached are the medications your provider expects you to take. Review all of your home medications and newly ordered medications with your provider and/or pharmacist. Follow medication instructions as directed by your provider and/or pharmacist. Please keep your medication list with you and share with your provider. Update the information when medications are discontinued, doses are changed, or new medications (including over-the-counter products) are added; and carry medication information at all times in the event of emergency situations     Allergies:  ALLEGRA-D - (reactions " not documented)               Medications  Valid as of: April 28, 2017 - 10:06 AM    Generic Name Brand Name Tablet Size Instructions for use    Benzonatate (Cap) TESSALON 200 MG Take 1 Cap by mouth 3 times a day as needed for Cough.        Docusate Sodium (Cap)  MG Take 100 mg by mouth 2 times a day as needed for Constipation.        Hydrocodone-Acetaminophen (Tab) NORCO 5-325 MG Take 1 Tab by mouth every four hours as needed.        Ibuprofen (Tab) MOTRIN 600 MG Take 1.5 Tabs by mouth every 6 hours as needed (Cramping).        Prenatal Vit-Fe Fumarate-FA (Tab) PRENATAL S 27-0.8 MG Take 1 Tab by mouth every morning.        Sulfamethoxazole-Trimethoprim (Tab) BACTRIM -160 MG Take 1 Tab by mouth 2 times a day for 7 days.        .                 Medicines prescribed today were sent to:     [id number] - [place], [place] - [address] AT [address] [address]    Phone: [phone] Fax: [phone]    Open 24 Hours?: No      Medication refill instructions:       If your prescription bottle indicates you have medication refills left, it is not necessary to call your provider’s office. Please contact your pharmacy and they will refill your medication.    If your prescription bottle indicates you do not have any refills left, you may request refills at any time through one of the following ways: The online [employer]cal[employer] system (except Urgent Care), by calling your provider’s office, or by asking your pharmacy to contact your provider’s office with a refill request. Medication refills are processed only during regular business hours and may not be available until the next business day. Your provider may request additional information or to have a follow-up visit with you prior to refilling your medication.   *Please Note: Medication refills are assigned a new Rx number when refilled electronically. Your pharmacy may indicate that no refills were authorized even  though a new prescription for the same medication is available at the pharmacy. Please request the medicine by name with the pharmacy before contacting your provider for a refill.           ETC Education Access Code: [email or web address]  Expires: 5/21/2017 11:51 AM    ETC Education  A secure, online tool to manage your health information     [employer]’s ETC Education® is a secure, online tool that connects you to your personalized health information from the privacy of your home -- day or night - making it very easy for you to manage your healthcare. Once the activation process is completed, you can even access your medical information using the ETC Education kris, which is available for free in the Apple Kris store or Google Play store.     ETC Education provides the following levels of access (as shown below):   My Chart Features   [facility] Doctor [facility]  Urgent  Care Non-[facility]  Primary Care  Doctor   Email your healthcare team securely and privately 24/7 X X X    Manage appointments: schedule your next appointment; view details of past/upcoming appointments X      Request prescription refills. X      View recent personal medical records, including lab and immunizations X X X X   View health record, including health history, allergies, medications X X X X   Read reports about your outpatient visits, procedures, consult and ER notes X X X X   See your discharge summary, which is a recap of your hospital and/or ER visit that includes your diagnosis, lab results, and care plan. X X       How to register for ETC Education:  1. Go to  [email or web address].  2. Click on the Sign Up Now box, which takes you to the New Member Sign Up page. You will need to provide the following information:  a. Enter your ETC Education Access Code exactly as it appears at the top of this page. (You will not need to use this code after you’ve completed the sign-up process. If you do not sign up before the expiration date, you must request a  new code.)   b. Enter your date of birth.   c. Enter your home email address.   d. Click Submit, and follow the next screen’s instructions.  3. Create a Jana Mobile ID. This will be your Jana Mobile login ID and cannot be changed, so think of one that is secure and easy to remember.  4. Create a Marketecturet password. You can change your password at any time.  5. Enter your Password Reset Question and Answer. This can be used at a later time if you forget your password.   6. Enter your e-mail address. This allows you to receive e-mail notifications when new information is available in Jana Mobile.  7. Click Sign Up. You can now view your health information.    For assistance activating your Jana Mobile account, call [phone]        Quit Tobacco Information     Do you want to quit using tobacco?    Quitting tobacco decreases risks of cancer, heart and lung disease, increases life expectancy, improves sense of taste and smell, and increases spending money, among other benefits.    If you are thinking about quitting, we can help.  • [facility] Quit Tobacco Program: 865.886.8777  o Program occurs weekly for four weeks and includes pharmacist consultation on products to support quitting smoking or chewing tobacco. A provider referral is needed for pharmacist consultation.  • Tobacco Users Help Hotline: 5-574-QUIT-NOW (572-3701) or https://[place].quitlogix.org/  o Free, confidential telephone and online coaching for [place] residents. Sessions are designed on a schedule that is convenient for you. Eligible clients receive free nicotine replacement therapy.  • Nationally: www.smokefree.gov  o Information and professional assistance to support both immediate and long-term needs as you become, and remain, a non-smoker. Smokefree.gov allows you to choose the help that best fits your needs.         09-Mar-2020 09:37

## 2020-03-12 ENCOUNTER — OFFICE VISIT (OUTPATIENT)
Dept: MEDICAL GROUP | Facility: PHYSICIAN GROUP | Age: 40
End: 2020-03-12
Payer: COMMERCIAL

## 2020-03-12 VITALS
OXYGEN SATURATION: 100 % | TEMPERATURE: 98.3 F | RESPIRATION RATE: 16 BRPM | SYSTOLIC BLOOD PRESSURE: 104 MMHG | DIASTOLIC BLOOD PRESSURE: 62 MMHG | BODY MASS INDEX: 28.49 KG/M2 | HEART RATE: 87 BPM | WEIGHT: 171 LBS | HEIGHT: 65 IN

## 2020-03-12 DIAGNOSIS — I25.10 CORONARY ARTERY DISEASE INVOLVING NATIVE CORONARY ARTERY OF NATIVE HEART WITHOUT ANGINA PECTORIS: ICD-10-CM

## 2020-03-12 DIAGNOSIS — Z72.0 TOBACCO ABUSE: ICD-10-CM

## 2020-03-12 PROCEDURE — 99214 OFFICE O/P EST MOD 30 MIN: CPT | Performed by: FAMILY MEDICINE

## 2020-03-12 RX ORDER — BUPROPION HYDROCHLORIDE 75 MG/1
150 TABLET ORAL 2 TIMES DAILY
COMMUNITY
Start: 2020-02-27 | End: 2021-08-26

## 2020-03-12 RX ORDER — TICAGRELOR 90 MG/1
TABLET ORAL
COMMUNITY
Start: 2020-02-27 | End: 2021-08-26

## 2020-03-12 RX ORDER — PANTOPRAZOLE SODIUM 40 MG/1
40 TABLET, DELAYED RELEASE ORAL
COMMUNITY
Start: 2020-02-27 | End: 2020-03-12

## 2020-03-12 RX ORDER — LISINOPRIL 5 MG/1
2.5 TABLET ORAL
COMMUNITY
Start: 2020-02-27 | End: 2021-12-15

## 2020-03-12 RX ORDER — ASPIRIN 81 MG/1
81 TABLET, COATED ORAL
COMMUNITY
Start: 2020-02-27

## 2020-03-12 RX ORDER — ATORVASTATIN CALCIUM 40 MG/1
20 TABLET, FILM COATED ORAL
COMMUNITY
Start: 2020-02-27 | End: 2021-08-26

## 2020-03-12 ASSESSMENT — PATIENT HEALTH QUESTIONNAIRE - PHQ9: CLINICAL INTERPRETATION OF PHQ2 SCORE: 0

## 2020-03-12 NOTE — ASSESSMENT & PLAN NOTE
New problem to examiner.  Patient was seen at [facility] for acute myocardial infarction and admitted to the hospital with angiogram noting 99% occlusion of left circumflex that was stented with a drug-eluting stent.  Patient currently asymptomatic and without chest pain.  Currently on lisinopril 5 mg once daily, metoprolol 25 mg twice, Brilinta 90 mg once daily, aspirin 81 mg daily, atorvastatin 40 mg daily.

## 2020-03-12 NOTE — PROGRESS NOTES
CC:Diagnoses of Coronary artery disease involving native coronary artery of native heart without angina pectoris and Tobacco abuse were pertinent to this visit.      HISTORY OF PRESENT ILLNESS: Patient is a 39 y.o. female established patient who presents today to discuss hospital follow-up from recent cardiac admission to [facility].  On 2/25/2020 patient was seen in urgent care for neck pain.  Patient had been having constant dull progressive neck pain that radiated to her back.  Patient was seen in urgent care and sent home with Medrol Dosepak and Flexeril for suspected muscle strain.  Patient had progression of her neck and back pain to chest pain with radiation to her right arm that continue to be dull and constant in nature.  Patient went to emergency department later that same day and was ultimately admitted with rising troponin levels.  Patient received an angiogram with drug-eluting stent placement with Dr. Torres and had follow-up appointment yesterday with cardiology.  Currently she is on dual antiplatelet therapy.  Dual antihypertensive therapy and high-dose statin.  Denies any side effects of medications currently    Coronary artery disease involving native coronary artery of native heart without angina pectoris  New problem to examiner.  Patient was seen at [facility] for acute myocardial infarction and admitted to the hospital with angiogram noting 99% occlusion of left circumflex that was stented with a drug-eluting stent.  Patient currently asymptomatic and without chest pain.  Currently on lisinopril 5 mg once daily, metoprolol 25 mg twice, Brilinta 90 mg once daily, aspirin 81 mg daily, atorvastatin 40 mg daily.    Tobacco abuse  Chronic improving medical condition.  Patient continues to smoke 3 cigarettes/day and is taking nicotine 7 mg patches daily to help with smoking cessation.      Patient Active Problem List    Diagnosis Date Noted   • Coronary artery  disease involving native coronary artery of native heart without angina pectoris 03/12/2020   • Breast cyst, left 10/04/2019   • Well woman exam 10/25/2018   • Prepatellar bursitis of right knee 09/27/2018   • Overweight (BMI 25.0-29.9) 09/27/2018   • Tobacco abuse 03/12/2017      Allergies:Allegra-d    Current Outpatient Medications   Medication Sig Dispense Refill   • ASPIRIN LOW DOSE 81 MG EC tablet Take 81 mg by mouth every day.     • atorvastatin (LIPITOR) 40 MG Tab Take  by mouth. Take 1 tablet by mouth every night at bedtime     • buPROPion (WELLBUTRIN) 75 MG Tab Take 150 mg by mouth 2 times a day.     • lisinopril (PRINIVIL) 5 MG Tab Take 2.5 mg by mouth.     • metoprolol (LOPRESSOR) 25 MG Tab TAKE 1/2 TAB BY MOUTH TWICE DAILY     • BRILINTA 90 MG Tab tablet TAKE 1 TABLET BY MOUTH TWICE A DAY     • nicotine (NICODERM) 7 MG/24HR PATCH 24 HR Apply 1 Patch to skin as directed every 24 hours. 15 Patch 0     No current facility-administered medications for this visit.        Social History     Tobacco Use   • Smoking status: Current Every Day Smoker     Packs/day: 0.50     Years: 10.00     Pack years: 5.00     Types: Cigarettes   • Smokeless tobacco: Never Used   Substance Use Topics   • Alcohol use: No   • Drug use: No     Social History     Social History Narrative   • Not on file       Family History   Problem Relation Age of Onset   • Other Mother         Cirrhosis not from drinking   • Blood Disease Father    • Lung Disease Paternal Aunt         From smoking.   • Breast Cancer Paternal Grandmother    • Diabetes Brother    • Kidney Disease Brother        Review of Systems:    Constitutional: No Fevers, Chills  Eyes: No vision changes  ENT: No hearing changes  Resp: No Shortness of breath  CV: No Chest pain  GI: No Nausea/Vomiting  MSK: No weakness  Skin: No rashes  Neuro: No Headaches  Psych: No Suicidal ideations    All remaining systems reviewed and found to be negative, except as stated above.    Exam:   "  /62 (BP Location: Left arm, Patient Position: Sitting, BP Cuff Size: Adult)   Pulse 87   Temp 36.8 °C (98.3 °F) (Temporal)   Resp 16   Ht 1.651 m (5' 5\")   Wt 77.6 kg (171 lb)   SpO2 100%  Body mass index is 28.46 kg/m².    GENERAL: No acute distress  HENT: Atraumatic, normocephalic, external auditory canals clear bilaterally, TMs translucent and pearly gray, nares clear without discharge, posterior pharynx clear without erythema or exudates.  EYES: Extraocular movements intact, pupils equal and reactive to light.  NECK: Supple, FROM  CHEST: No deformities, Equal chest expansion, no murmurs, gallops, or rubs.  RESP: Unlabored, no stridor or audible wheeze.  No wheezes, crackles, nor rhonchi  ABD: Soft, Nontender, Non-Distended.  Normal bowel sounds.  No hepatosplenomegaly  Extremities: No Clubbing, Cyanosis, or Edema.  Skin: Warm/dry, without rashes  Neuro: A/O x 4, CN 2-12 Grossly intact, Motor/sensory grossly intact  Psych: Normal behavior, normal affect    Additional records requested from [facility].    Current scanned records show elevated troponin on 2/27/2020 of 34    Discharge summary from 2/27/2020 [facility] admission reviewed and summarized as above. 2/25/2020 urgent care visit notes reviewed    Assessment/Plan:  1. Coronary artery disease involving native coronary artery of native heart without angina pectoris  New problem to examiner.  Continue Brilinta, aspirin, atorvastatin, metoprolol, lisinopril.  No changes in medications at this time.    2. Tobacco abuse  Patient is advised to stop using tobacco and tobacco products. We discussed abrupt cessation, nicotine gum or patches, medications such as bupropion or Chantix, and nicotine inhalers including electronic cigarettes.  More than 10 minutes was spent with the patient discussing related health consequences of tobacco use, nonpharmacologic and pharmacologic treatment modalities.  Educational " handout also provided.    Follow-up in 6 months or sooner as needed.  Continue follow-up with regularly scheduled cardiology appointments.    Patient was seen for at least 25 minutes face to face of which > 50% of appointment time was spent on counseling and coordination of care regarding the above.    Please note that this dictation was created using voice recognition software. I have worked with consultants from the vendor as well as technical experts from [facility] to optimize the interface. I have made every reasonable attempt to correct obvious errors, but I expect that there are errors of grammar and possibly content that I did not discover before finalizing the note.

## 2020-03-12 NOTE — ASSESSMENT & PLAN NOTE
Chronic improving medical condition.  Patient continues to smoke 3 cigarettes/day and is taking nicotine 7 mg patches daily to help with smoking cessation.

## 2020-09-11 ENCOUNTER — HOSPITAL ENCOUNTER (OUTPATIENT)
Dept: LAB | Facility: MEDICAL CENTER | Age: 40
End: 2020-09-11
Attending: INTERNAL MEDICINE
Payer: COMMERCIAL

## 2020-09-11 LAB
CHOLEST SERPL-MCNC: 106 MG/DL (ref 100–199)
FASTING STATUS PATIENT QL REPORTED: NORMAL
HDLC SERPL-MCNC: 50 MG/DL
LDLC SERPL CALC-MCNC: 38 MG/DL
TRIGL SERPL-MCNC: 92 MG/DL (ref 0–149)

## 2020-09-11 PROCEDURE — 80061 LIPID PANEL: CPT

## 2020-09-11 PROCEDURE — 36415 COLL VENOUS BLD VENIPUNCTURE: CPT

## 2020-09-18 ENCOUNTER — TELEMEDICINE (OUTPATIENT)
Dept: MEDICAL GROUP | Facility: PHYSICIAN GROUP | Age: 40
End: 2020-09-18
Payer: COMMERCIAL

## 2020-09-18 ENCOUNTER — HOSPITAL ENCOUNTER (OUTPATIENT)
Dept: LAB | Facility: MEDICAL CENTER | Age: 40
End: 2020-09-18
Attending: FAMILY MEDICINE
Payer: COMMERCIAL

## 2020-09-18 VITALS — TEMPERATURE: 97.6 F | HEIGHT: 65 IN | BODY MASS INDEX: 28.49 KG/M2 | WEIGHT: 171 LBS

## 2020-09-18 DIAGNOSIS — R23.2 HOT FLASHES: ICD-10-CM

## 2020-09-18 DIAGNOSIS — Z72.0 TOBACCO ABUSE: ICD-10-CM

## 2020-09-18 DIAGNOSIS — R53.83 FATIGUE, UNSPECIFIED TYPE: ICD-10-CM

## 2020-09-18 DIAGNOSIS — M70.42 PREPATELLAR BURSITIS OF LEFT KNEE: ICD-10-CM

## 2020-09-18 LAB
ALBUMIN SERPL BCP-MCNC: 4.7 G/DL (ref 3.2–4.9)
ALBUMIN/GLOB SERPL: 1.6 G/DL
ALP SERPL-CCNC: 82 U/L (ref 30–99)
ALT SERPL-CCNC: 24 U/L (ref 2–50)
ANION GAP SERPL CALC-SCNC: 14 MMOL/L (ref 7–16)
AST SERPL-CCNC: 21 U/L (ref 12–45)
BASOPHILS # BLD AUTO: 0.6 % (ref 0–1.8)
BASOPHILS # BLD: 0.05 K/UL (ref 0–0.12)
BILIRUB SERPL-MCNC: 0.2 MG/DL (ref 0.1–1.5)
BUN SERPL-MCNC: 12 MG/DL (ref 8–22)
CALCIUM SERPL-MCNC: 9.1 MG/DL (ref 8.5–10.5)
CHLORIDE SERPL-SCNC: 98 MMOL/L (ref 96–112)
CO2 SERPL-SCNC: 22 MMOL/L (ref 20–33)
CREAT SERPL-MCNC: 0.58 MG/DL (ref 0.5–1.4)
EOSINOPHIL # BLD AUTO: 0.33 K/UL (ref 0–0.51)
EOSINOPHIL NFR BLD: 3.8 % (ref 0–6.9)
ERYTHROCYTE [DISTWIDTH] IN BLOOD BY AUTOMATED COUNT: 43.5 FL (ref 35.9–50)
FASTING STATUS PATIENT QL REPORTED: NORMAL
FSH SERPL-ACNC: 1.6 MIU/ML
GLOBULIN SER CALC-MCNC: 2.9 G/DL (ref 1.9–3.5)
GLUCOSE SERPL-MCNC: 82 MG/DL (ref 65–99)
HCT VFR BLD AUTO: 45.6 % (ref 37–47)
HGB BLD-MCNC: 15.5 G/DL (ref 12–16)
IMM GRANULOCYTES # BLD AUTO: 0.03 K/UL (ref 0–0.11)
IMM GRANULOCYTES NFR BLD AUTO: 0.3 % (ref 0–0.9)
LYMPHOCYTES # BLD AUTO: 2.73 K/UL (ref 1–4.8)
LYMPHOCYTES NFR BLD: 31.5 % (ref 22–41)
MCH RBC QN AUTO: 31.6 PG (ref 27–33)
MCHC RBC AUTO-ENTMCNC: 34 G/DL (ref 33.6–35)
MCV RBC AUTO: 93.1 FL (ref 81.4–97.8)
MONOCYTES # BLD AUTO: 0.84 K/UL (ref 0–0.85)
MONOCYTES NFR BLD AUTO: 9.7 % (ref 0–13.4)
NEUTROPHILS # BLD AUTO: 4.69 K/UL (ref 2–7.15)
NEUTROPHILS NFR BLD: 54.1 % (ref 44–72)
NRBC # BLD AUTO: 0 K/UL
NRBC BLD-RTO: 0 /100 WBC
PLATELET # BLD AUTO: 291 K/UL (ref 164–446)
PMV BLD AUTO: 9.4 FL (ref 9–12.9)
POTASSIUM SERPL-SCNC: 4 MMOL/L (ref 3.6–5.5)
PROT SERPL-MCNC: 7.6 G/DL (ref 6–8.2)
RBC # BLD AUTO: 4.9 M/UL (ref 4.2–5.4)
SODIUM SERPL-SCNC: 134 MMOL/L (ref 135–145)
T4 FREE SERPL-MCNC: 1.08 NG/DL (ref 0.93–1.7)
TSH SERPL DL<=0.005 MIU/L-ACNC: 0.64 UIU/ML (ref 0.38–5.33)
WBC # BLD AUTO: 8.7 K/UL (ref 4.8–10.8)

## 2020-09-18 PROCEDURE — 99214 OFFICE O/P EST MOD 30 MIN: CPT | Mod: 95,CR | Performed by: FAMILY MEDICINE

## 2020-09-18 PROCEDURE — 85025 COMPLETE CBC W/AUTO DIFF WBC: CPT

## 2020-09-18 PROCEDURE — 80053 COMPREHEN METABOLIC PANEL: CPT

## 2020-09-18 PROCEDURE — 84443 ASSAY THYROID STIM HORMONE: CPT

## 2020-09-18 PROCEDURE — 36415 COLL VENOUS BLD VENIPUNCTURE: CPT

## 2020-09-18 PROCEDURE — 84439 ASSAY OF FREE THYROXINE: CPT

## 2020-09-18 PROCEDURE — 83001 ASSAY OF GONADOTROPIN (FSH): CPT

## 2020-09-18 RX ORDER — ATORVASTATIN CALCIUM 20 MG/1
TABLET, FILM COATED ORAL
COMMUNITY
Start: 2020-09-15 | End: 2020-09-18

## 2020-09-19 NOTE — PROGRESS NOTES
Virtual Visit: Established Patient   This visit was conducted via Zoom using secure and encrypted videoconferencing technology. The patient was in a private location in the state of [place].    The patient's identity was confirmed and verbal consent was obtained for this virtual visit.    Subjective:   CC:   Chief Complaint   Patient presents with   • Knee Pain     Bursitis. 3 weeks   • Other     Pre-menopause        Leo Alarcon is a 39 y.o. female presenting for evaluation and management of:    New prepatellar bursitis of the left knee.  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  Previous bursitis injections and drainage of the right side but she is having similar symptoms on the left over the last 2 weeks.  Counseled on follow-up in person for additional evaluation.    She also is complaining of 2 months of worsening hot flashes and night sweats, fatigue and heat intolerance.  Patient states that this is worse over the last 2 months and she is also noted a slight increase in her menstrual bleeding.  Patient states that her cycles are always irregular and has not had any cessation or prolonged absences or pauses in her periods.  Patient denies any painful menstrual bleeding but does note an increase of the amount of blood.  Patient also had a Mirena placed 5-6 years ago with a birth of her youngest child.  Patient denies any weight gain or weight loss, changes in bowel or bladder habits, changes in skin/hair/nails.    Patient also continues to smoke cigarettes and we counseled on cessation strategies given some of the recent cardiac events as well as new fatigue and night sweats.    ROS   Denies any recent fevers or chills. No nausea or vomiting. No chest pains or shortness of breath.     Allergies   Allergen Reactions   • Allegra-D        Current medicines (including changes today)  Current Outpatient Medications  "  Medication Sig Dispense Refill   • ASPIRIN LOW DOSE 81 MG EC tablet Take 81 mg by mouth every day.     • atorvastatin (LIPITOR) 40 MG Tab Take  by mouth. Take 1 tablet by mouth every night at bedtime     • lisinopril (PRINIVIL) 5 MG Tab Take 2.5 mg by mouth.     • metoprolol (LOPRESSOR) 25 MG Tab TAKE 1/2 TAB BY MOUTH TWICE DAILY     • BRILINTA 90 MG Tab tablet TAKE 1 TABLET BY MOUTH TWICE A DAY     • buPROPion (WELLBUTRIN) 75 MG Tab Take 150 mg by mouth 2 times a day.     • nicotine (NICODERM) 7 MG/24HR PATCH 24 HR Apply 1 Patch to skin as directed every 24 hours. (Patient not taking: Reported on 9/18/2020) 15 Patch 0     No current facility-administered medications for this visit.        Patient Active Problem List    Diagnosis Date Noted   • Prepatellar bursitis of left knee 09/18/2020   • Coronary artery disease involving native coronary artery of native heart without angina pectoris 03/12/2020   • Breast cyst, left 10/04/2019   • Well woman exam 10/25/2018   • Prepatellar bursitis of right knee 09/27/2018   • Overweight (BMI 25.0-29.9) 09/27/2018   • Tobacco abuse 03/12/2017       Family History   Problem Relation Age of Onset   • Other Mother         Cirrhosis not from drinking   • Blood Disease Father    • Lung Disease Paternal Aunt         From smoking.   • Breast Cancer Paternal Grandmother    • Diabetes Brother    • Kidney Disease Brother        She  has a past medical history of ASTHMA, Bursitis of both knees, and S/P tooth extraction. She also has no past medical history of Diabetes or Seizure (HCC).  She  has no past surgical history on file.       Objective:   Temp 36.4 °C (97.6 °F) (Temporal)   Ht 1.651 m (5' 5\") Comment: Per patient  Wt 77.6 kg (171 lb) Comment: Per patient  BMI 28.46 kg/m²     Physical Exam:  Constitutional: Alert, no distress, well-groomed.  Skin: No rashes in visible areas.  Eye: Round. Conjunctiva clear, lids normal. No icterus.   ENMT: Lips pink without lesions, good " dentition, moist mucous membranes. Phonation normal.  Neck: No masses, no thyromegaly. Moves freely without pain.  Respiratory: Unlabored respiratory effort, no cough or audible wheeze  Visible swelling noted on left knee prepatellar space.  No erythema noted on video  Psych: Alert and oriented x3, normal affect and mood.       Assessment and Plan:   The following treatment plan was discussed:     1. Hot flashes  - TSH; Future  - FREE THYROXINE; Future  - FSH; Future  - CBC WITH DIFFERENTIAL; Future  - Comp Metabolic Panel; Future    2. Fatigue, unspecified type    3. Prepatellar bursitis of left knee    4. Tobacco abuse    Problems as above.  Uncertain prognosis.  Labs to be completed as above and follow-up in person in the office to evaluate prepatellar bursitis, and review labs.  Counseled on replacing Mirena and possibly performing an endometrial biopsy to evaluate for any endometrial abnormalities contributing to increase in menstrual flow.    Follow-up: 2 weeks for lab review       Please note that this dictation was created using voice recognition software. I have worked with consultants from the vendor as well as technical experts from [employer] to optimize the interface. I have made every reasonable attempt to correct obvious errors, but I expect that there are errors of grammar and possibly content that I did not discover before finalizing the note.

## 2020-10-02 ENCOUNTER — OFFICE VISIT (OUTPATIENT)
Dept: MEDICAL GROUP | Facility: PHYSICIAN GROUP | Age: 40
End: 2020-10-02
Payer: COMMERCIAL

## 2020-10-02 ENCOUNTER — HOSPITAL ENCOUNTER (OUTPATIENT)
Facility: MEDICAL CENTER | Age: 40
End: 2020-10-02
Attending: FAMILY MEDICINE
Payer: COMMERCIAL

## 2020-10-02 VITALS
WEIGHT: 177 LBS | DIASTOLIC BLOOD PRESSURE: 68 MMHG | HEIGHT: 65 IN | RESPIRATION RATE: 16 BRPM | BODY MASS INDEX: 29.49 KG/M2 | TEMPERATURE: 97.9 F | HEART RATE: 64 BPM | OXYGEN SATURATION: 98 % | SYSTOLIC BLOOD PRESSURE: 100 MMHG

## 2020-10-02 DIAGNOSIS — N93.9 ABNORMAL UTERINE BLEEDING: ICD-10-CM

## 2020-10-02 DIAGNOSIS — Z30.430 ENCOUNTER FOR INSERTION OF COPPER INTRAUTERINE CONTRACEPTIVE DEVICE (IUD): ICD-10-CM

## 2020-10-02 DIAGNOSIS — Z23 NEED FOR VACCINATION: ICD-10-CM

## 2020-10-02 DIAGNOSIS — M70.42 PREPATELLAR BURSITIS, LEFT KNEE: ICD-10-CM

## 2020-10-02 LAB — PATHOLOGY CONSULT NOTE: NORMAL

## 2020-10-02 PROCEDURE — 90471 IMMUNIZATION ADMIN: CPT | Performed by: FAMILY MEDICINE

## 2020-10-02 PROCEDURE — 58300 INSERT INTRAUTERINE DEVICE: CPT | Performed by: FAMILY MEDICINE

## 2020-10-02 PROCEDURE — 58301 REMOVE INTRAUTERINE DEVICE: CPT | Performed by: FAMILY MEDICINE

## 2020-10-02 PROCEDURE — 20610 DRAIN/INJ JOINT/BURSA W/O US: CPT | Mod: 51 | Performed by: FAMILY MEDICINE

## 2020-10-02 PROCEDURE — 90686 IIV4 VACC NO PRSV 0.5 ML IM: CPT | Performed by: FAMILY MEDICINE

## 2020-10-02 PROCEDURE — 88305 TISSUE EXAM BY PATHOLOGIST: CPT

## 2020-10-02 RX ORDER — TRIAMCINOLONE ACETONIDE 40 MG/ML
40 INJECTION, SUSPENSION INTRA-ARTICULAR; INTRAMUSCULAR ONCE
Status: COMPLETED | OUTPATIENT
Start: 2020-10-02 | End: 2020-10-02

## 2020-10-02 RX ADMIN — TRIAMCINOLONE ACETONIDE 40 MG: 40 INJECTION, SUSPENSION INTRA-ARTICULAR; INTRAMUSCULAR at 14:04

## 2020-10-02 ASSESSMENT — FIBROSIS 4 INDEX: FIB4 SCORE: 0.57

## 2020-10-02 NOTE — PROGRESS NOTES
Joint Injection Procedure Note    PRE-OP DIAGNOSIS: Prepatellar bursitis of the left knee.  POST-OP DIAGNOSIS: Same   PROCEDURE: Left knee bursa aspiration and injection  Performing Physician: Paulie Chiu MD      Dose:         2 cc 2%   Lidocaine and Steroid 40mg/mL Triamcinolone acetonide             Prep: Betadine x3     Medication lot number and expiration date and MA notes.     Procedure:     Risks, benefits, and alternatives discussed prior to the procedure.  Informed consent was obtained (please see scanned consent forms).  The area was prepped in the usual sterile manner. The 22-gauge 1-1/2 inch needle was inserted into the left prepatellar bursa, 4 milliliters of fluid was aspirated and removed, and the steroid/lidocaine mixture was injected for total volume of 3 cc.  There were no complications during this procedure.     Followup: The patient tolerated the procedure well without complications.  Standard post-procedure care is explained and return precautions are given    IUD Intrauterine Device Removal Procedure Note    PRE-OP DIAGNOSIS: Desire for long-acting reversible contraceptive management  POST-OP DIAGNOSIS: Same   PROCEDURE: IUD removal  Performing Physician: Paulie Chiu MD       PROCEDURE:   The speculum was placed and the IUD string visualized.  Using ringed forceps, the IUD string was grasped and the device was removed without difficulty.  Bleeding was minimal.     Followup: The patient tolerated the procedure well without complications.  Standard post-procedure care is explained and return precautions are given.    IUD Intrauterine Device Insertion Procedure Note    PRE-OP DIAGNOSIS: desired long-term, reversible contraception   POST-OP DIAGNOSIS: Same   PROCEDURE: IUD placement  Performing Physician: Paulie Chiu MD      Checklist:  Multiple Partners  No  Dysmenorrhea  No  Copper Allergy   No  PID/STD    No  Ectopic Pregnancy   No  Breastfeeding   No  Pregnancy Test: not done     IUD  type: Paragard     PROCEDURE:   Patient counseled on risks, benefits, and alternatives of IUD placement, and gives consent for procedure today.  Timeout prior to procedure was performed to ensure right patient and right site.  A bimanual exam was performed to determine the position of the uterus. The speculum was placed. The vagina and cervix was sterilized in the usual manner and sterile technique was maintained throughout the course of the procedure.  Endometrial biopsy sample obtained, and then the depth of the uterus was sounded to be of appropriate depth 8 cm. The IUD was inserted to the appropriate depth and deposited by withdrawing on the insertion tube holding the narda steady.  The string was cut to an estimated 4 cm length. Bleeding was minimal. The patient tolerated the procedure well.      Followup: The patient tolerated the procedure well without complications.  Standard post-procedure care is explained and return precautions are given.

## 2020-10-05 ENCOUNTER — TELEPHONE (OUTPATIENT)
Dept: MEDICAL GROUP | Facility: PHYSICIAN GROUP | Age: 40
End: 2020-10-05

## 2020-10-05 NOTE — TELEPHONE ENCOUNTER
VOICEMAIL: 10/05/2020 @ 8:00 am  1. Caller Name: [facility]                      Call Back Number: [phone]    2. Message: Pearl stated she has a specimen with two location on it Right knee and tissue Endometrium. Needs to be advise which is the correct specimen.    3. Patient approves office to leave a detailed voicemail/MyChart message: N\A

## 2020-10-06 ENCOUNTER — TELEPHONE (OUTPATIENT)
Dept: MEDICAL GROUP | Facility: PHYSICIAN GROUP | Age: 40
End: 2020-10-06

## 2020-10-06 NOTE — TELEPHONE ENCOUNTER
1. Caller Name: Leo Alarcon                          Call Back Number: [phone] (home)       How would the patient prefer to be contacted with a response: Phone call OK to leave a detailed message    Patient is asking for a metal health letter? Please advise.

## 2020-10-06 NOTE — LETTER
October 06, 2020       Patient: Leo Alarcon   YOB: 1980   Date of Visit: 10/6/2020         To Whom It May Concern:    In my medical opinion, I recommend that Leo Alarcon remain out of work on 10/6/2020 for conditions currently under my care.    If you have any questions or concerns, please don't hesitate to call [phone]          Sincerely,          Paulie Chiu MD MPH

## 2020-10-07 NOTE — TELEPHONE ENCOUNTER
Phone Number Called:[phone] (home)        Call outcome: Left detailed message for patient. Informed to call back with any additional questions.    Message: Leo is asking for a letter and Dr. Chiu would like more insight as to what the letter needs to say.

## 2020-10-07 NOTE — TELEPHONE ENCOUNTER
Please call patient back and clarify what she needs in the letter and for whom does she needs a letter?

## 2020-10-09 NOTE — TELEPHONE ENCOUNTER
Per Pt: Letter saying she just need a mental health day since she called in and does not have any sick day. She wants it dated for 10/06/2020. Att: Otis Montelongo. Company: [employer].

## 2021-02-13 ENCOUNTER — HOSPITAL ENCOUNTER (OUTPATIENT)
Dept: LAB | Facility: MEDICAL CENTER | Age: 41
End: 2021-02-13
Attending: INTERNAL MEDICINE
Payer: COMMERCIAL

## 2021-02-13 LAB
CHOLEST SERPL-MCNC: 120 MG/DL (ref 100–199)
FASTING STATUS PATIENT QL REPORTED: NORMAL
HDLC SERPL-MCNC: 53 MG/DL
LDLC SERPL CALC-MCNC: 51 MG/DL
TRIGL SERPL-MCNC: 78 MG/DL (ref 0–149)

## 2021-02-13 PROCEDURE — 36415 COLL VENOUS BLD VENIPUNCTURE: CPT

## 2021-02-13 PROCEDURE — 80061 LIPID PANEL: CPT

## 2021-08-19 ENCOUNTER — HOSPITAL ENCOUNTER (EMERGENCY)
Facility: MEDICAL CENTER | Age: 41
End: 2021-08-20
Attending: EMERGENCY MEDICINE
Payer: COMMERCIAL

## 2021-08-19 DIAGNOSIS — T14.8XXA MUSCLE STRAIN: ICD-10-CM

## 2021-08-19 LAB
ALBUMIN SERPL BCP-MCNC: 4.4 G/DL (ref 3.2–4.9)
ALBUMIN/GLOB SERPL: 1.4 G/DL
ALP SERPL-CCNC: 79 U/L (ref 30–99)
ALT SERPL-CCNC: 12 U/L (ref 2–50)
ANION GAP SERPL CALC-SCNC: 12 MMOL/L (ref 7–16)
AST SERPL-CCNC: 12 U/L (ref 12–45)
BASOPHILS # BLD AUTO: 0.2 % (ref 0–1.8)
BASOPHILS # BLD: 0.03 K/UL (ref 0–0.12)
BILIRUB SERPL-MCNC: 0.3 MG/DL (ref 0.1–1.5)
BUN SERPL-MCNC: 8 MG/DL (ref 8–22)
CALCIUM SERPL-MCNC: 9.2 MG/DL (ref 8.5–10.5)
CHLORIDE SERPL-SCNC: 103 MMOL/L (ref 96–112)
CO2 SERPL-SCNC: 23 MMOL/L (ref 20–33)
CREAT SERPL-MCNC: 0.6 MG/DL (ref 0.5–1.4)
EKG IMPRESSION: NORMAL
EOSINOPHIL # BLD AUTO: 0.22 K/UL (ref 0–0.51)
EOSINOPHIL NFR BLD: 1.5 % (ref 0–6.9)
ERYTHROCYTE [DISTWIDTH] IN BLOOD BY AUTOMATED COUNT: 43.8 FL (ref 35.9–50)
GLOBULIN SER CALC-MCNC: 3.2 G/DL (ref 1.9–3.5)
GLUCOSE SERPL-MCNC: 93 MG/DL (ref 65–99)
HCT VFR BLD AUTO: 43.9 % (ref 37–47)
HGB BLD-MCNC: 14.8 G/DL (ref 12–16)
IMM GRANULOCYTES # BLD AUTO: 0.07 K/UL (ref 0–0.11)
IMM GRANULOCYTES NFR BLD AUTO: 0.5 % (ref 0–0.9)
LIPASE SERPL-CCNC: 33 U/L (ref 11–82)
LYMPHOCYTES # BLD AUTO: 3.34 K/UL (ref 1–4.8)
LYMPHOCYTES NFR BLD: 23.2 % (ref 22–41)
MCH RBC QN AUTO: 31.3 PG (ref 27–33)
MCHC RBC AUTO-ENTMCNC: 33.7 G/DL (ref 33.6–35)
MCV RBC AUTO: 92.8 FL (ref 81.4–97.8)
MONOCYTES # BLD AUTO: 1.19 K/UL (ref 0–0.85)
MONOCYTES NFR BLD AUTO: 8.3 % (ref 0–13.4)
NEUTROPHILS # BLD AUTO: 9.54 K/UL (ref 2–7.15)
NEUTROPHILS NFR BLD: 66.3 % (ref 44–72)
NRBC # BLD AUTO: 0 K/UL
NRBC BLD-RTO: 0 /100 WBC
PLATELET # BLD AUTO: 291 K/UL (ref 164–446)
PMV BLD AUTO: 9.5 FL (ref 9–12.9)
POTASSIUM SERPL-SCNC: 4 MMOL/L (ref 3.6–5.5)
PROT SERPL-MCNC: 7.6 G/DL (ref 6–8.2)
RBC # BLD AUTO: 4.73 M/UL (ref 4.2–5.4)
SODIUM SERPL-SCNC: 138 MMOL/L (ref 135–145)
TROPONIN T SERPL-MCNC: <6 NG/L (ref 6–19)
WBC # BLD AUTO: 14.4 K/UL (ref 4.8–10.8)

## 2021-08-19 PROCEDURE — 93005 ELECTROCARDIOGRAM TRACING: CPT | Performed by: EMERGENCY MEDICINE

## 2021-08-19 PROCEDURE — 99284 EMERGENCY DEPT VISIT MOD MDM: CPT

## 2021-08-19 PROCEDURE — 93005 ELECTROCARDIOGRAM TRACING: CPT

## 2021-08-19 PROCEDURE — 85025 COMPLETE CBC W/AUTO DIFF WBC: CPT

## 2021-08-19 PROCEDURE — 84703 CHORIONIC GONADOTROPIN ASSAY: CPT

## 2021-08-19 PROCEDURE — 36415 COLL VENOUS BLD VENIPUNCTURE: CPT

## 2021-08-19 PROCEDURE — 83690 ASSAY OF LIPASE: CPT

## 2021-08-19 PROCEDURE — 84484 ASSAY OF TROPONIN QUANT: CPT

## 2021-08-19 PROCEDURE — 80053 COMPREHEN METABOLIC PANEL: CPT

## 2021-08-19 ASSESSMENT — PAIN DESCRIPTION - DESCRIPTORS: DESCRIPTORS: SORE

## 2021-08-19 ASSESSMENT — FIBROSIS 4 INDEX: FIB4 SCORE: 0.59

## 2021-08-20 ENCOUNTER — APPOINTMENT (OUTPATIENT)
Dept: RADIOLOGY | Facility: MEDICAL CENTER | Age: 41
End: 2021-08-20
Attending: EMERGENCY MEDICINE
Payer: COMMERCIAL

## 2021-08-20 VITALS
DIASTOLIC BLOOD PRESSURE: 66 MMHG | TEMPERATURE: 98.2 F | SYSTOLIC BLOOD PRESSURE: 108 MMHG | BODY MASS INDEX: 32.74 KG/M2 | OXYGEN SATURATION: 94 % | WEIGHT: 191.8 LBS | HEIGHT: 64 IN | HEART RATE: 82 BPM | RESPIRATION RATE: 20 BRPM

## 2021-08-20 LAB
D DIMER PPP IA.FEU-MCNC: 0.33 UG/ML (FEU) (ref 0–0.5)
HCG SERPL QL: NEGATIVE

## 2021-08-20 PROCEDURE — A9270 NON-COVERED ITEM OR SERVICE: HCPCS | Performed by: EMERGENCY MEDICINE

## 2021-08-20 PROCEDURE — 71045 X-RAY EXAM CHEST 1 VIEW: CPT

## 2021-08-20 PROCEDURE — 85379 FIBRIN DEGRADATION QUANT: CPT

## 2021-08-20 PROCEDURE — 700102 HCHG RX REV CODE 250 W/ 637 OVERRIDE(OP): Performed by: EMERGENCY MEDICINE

## 2021-08-20 PROCEDURE — 700111 HCHG RX REV CODE 636 W/ 250 OVERRIDE (IP): Performed by: EMERGENCY MEDICINE

## 2021-08-20 RX ORDER — CYCLOBENZAPRINE HCL 10 MG
10 TABLET ORAL 3 TIMES DAILY PRN
Qty: 30 TABLET | Refills: 0 | Status: SHIPPED | OUTPATIENT
Start: 2021-08-20 | End: 2021-12-15

## 2021-08-20 RX ORDER — ONDANSETRON 4 MG/1
4 TABLET, ORALLY DISINTEGRATING ORAL ONCE
Status: COMPLETED | OUTPATIENT
Start: 2021-08-20 | End: 2021-08-20

## 2021-08-20 RX ORDER — IBUPROFEN 800 MG/1
800 TABLET ORAL EVERY 8 HOURS PRN
Qty: 30 TABLET | Refills: 0 | Status: SHIPPED | OUTPATIENT
Start: 2021-08-20 | End: 2021-12-15

## 2021-08-20 RX ORDER — HYDROCODONE BITARTRATE AND ACETAMINOPHEN 5; 325 MG/1; MG/1
2 TABLET ORAL ONCE
Status: COMPLETED | OUTPATIENT
Start: 2021-08-20 | End: 2021-08-20

## 2021-08-20 RX ADMIN — ONDANSETRON 4 MG: 4 TABLET, ORALLY DISINTEGRATING ORAL at 01:32

## 2021-08-20 RX ADMIN — HYDROCODONE BITARTRATE AND ACETAMINOPHEN 2 TABLET: 5; 325 TABLET ORAL at 01:32

## 2021-08-20 NOTE — ED TRIAGE NOTES
Leo Alarcon  40 y.o.  female  Chief Complaint   Patient presents with   • RUQ Pain     c/o mid back pain (right side) radiates to RUQ since yesterday. pain with deep breathing.

## 2021-08-20 NOTE — ED NOTES
DC home with written and verbal instructions regarding f/u, activity and RX. Verbalized understanding, ambulated out with all belongings.

## 2021-08-20 NOTE — ED PROVIDER NOTES
"ED Provider Note    CHIEF COMPLAINT  Chief Complaint   Patient presents with   • RUQ Pain     c/o mid back pain (right side) radiates to RUQ since yesterday. pain with deep breathing.       HPI  Leo Alarcon is a 40 y.o. female here for evaluation of right upper back pain.  The pt states that she has had upper right back pain over the last 2 days. She has no fever/chills. No vomiting.  She states the pain is worse with deep  Inspiration, better if she remains still. She has no abdominal pain, no headache.      ROS;  Please see HPI  O/W negative     PAST MEDICAL HISTORY   has a past medical history of ASTHMA, Bursitis of both knees, and S/P tooth extraction.    SOCIAL HISTORY  Social History     Tobacco Use   • Smoking status: Current Every Day Smoker     Packs/day: 0.50     Years: 10.00     Pack years: 5.00     Types: Cigarettes   • Smokeless tobacco: Never Used   Vaping Use   • Vaping Use: Never used   Substance and Sexual Activity   • Alcohol use: No   • Drug use: No   • Sexual activity: Yes     Partners: Male       SURGICAL HISTORY  patient denies any surgical history    CURRENT MEDICATIONS  Home Medications     Reviewed by Nando Coker R.N. (Registered Nurse) on 08/19/21 at 2120  Med List Status: Partial   Medication Last Dose Status   ASPIRIN LOW DOSE 81 MG EC tablet  Active   atorvastatin (LIPITOR) 40 MG Tab  Active   BRILINTA 90 MG Tab tablet  Active   buPROPion (WELLBUTRIN) 75 MG Tab  Active   lisinopril (PRINIVIL) 5 MG Tab  Active   metoprolol (LOPRESSOR) 25 MG Tab  Active   nicotine (NICODERM) 7 MG/24HR PATCH 24 HR  Active                ALLERGIES  Allergies   Allergen Reactions   • Allegra-D        REVIEW OF SYSTEMS  See HPI for further details. Review of systems as above, otherwise all other systems are negative.     PHYSICAL EXAM  VITAL SIGNS: /67   Pulse 92   Temp 36.9 °C (98.4 °F) (Temporal)   Resp 20   Ht 1.626 m (5' 4\")   Wt 87 kg (191 lb 12.8 oz)   LMP 07/19/2021   SpO2 95%   " BMI 32.92 kg/m²     Constitutional: Well developed, well nourished. No acute distress.  HEENT: Normocephalic, atraumatic. MMM  Neck: Supple, Full range of motion   Chest/Pulmonary:  No respiratory distress.  Equal expansion   Musculoskeletal: No deformity, no edema, neurovascular intact.   Back;  Tenderness to the right aspect of T11.  Non tender midline. Reproducible with palpation.    Neuro: Clear speech, appropriate, cooperative, cranial nerves II-XII grossly intact.  Psych: Normal mood and affect  Skin; warm, dry, no rash      PROCEDURES     MEDICAL RECORD  I have reviewed patient's medical record and pertinent results are listed.    COURSE & MEDICAL DECISION MAKING  I have reviewed any medical record information, laboratory studies and radiographic results as noted above.      Results for orders placed or performed during the hospital encounter of 08/19/21   CBC WITH DIFFERENTIAL   Result Value Ref Range    WBC 14.4 (H) 4.8 - 10.8 K/uL    RBC 4.73 4.20 - 5.40 M/uL    Hemoglobin 14.8 12.0 - 16.0 g/dL    Hematocrit 43.9 37.0 - 47.0 %    MCV 92.8 81.4 - 97.8 fL    MCH 31.3 27.0 - 33.0 pg    MCHC 33.7 33.6 - 35.0 g/dL    RDW 43.8 35.9 - 50.0 fL    Platelet Count 291 164 - 446 K/uL    MPV 9.5 9.0 - 12.9 fL    Neutrophils-Polys 66.30 44.00 - 72.00 %    Lymphocytes 23.20 22.00 - 41.00 %    Monocytes 8.30 0.00 - 13.40 %    Eosinophils 1.50 0.00 - 6.90 %    Basophils 0.20 0.00 - 1.80 %    Immature Granulocytes 0.50 0.00 - 0.90 %    Nucleated RBC 0.00 /100 WBC    Neutrophils (Absolute) 9.54 (H) 2.00 - 7.15 K/uL    Lymphs (Absolute) 3.34 1.00 - 4.80 K/uL    Monos (Absolute) 1.19 (H) 0.00 - 0.85 K/uL    Eos (Absolute) 0.22 0.00 - 0.51 K/uL    Baso (Absolute) 0.03 0.00 - 0.12 K/uL    Immature Granulocytes (abs) 0.07 0.00 - 0.11 K/uL    NRBC (Absolute) 0.00 K/uL   COMP METABOLIC PANEL   Result Value Ref Range    Sodium 138 135 - 145 mmol/L    Potassium 4.0 3.6 - 5.5 mmol/L    Chloride 103 96 - 112 mmol/L    Co2 23 20 - 33  mmol/L    Anion Gap 12.0 7.0 - 16.0    Glucose 93 65 - 99 mg/dL    Bun 8 8 - 22 mg/dL    Creatinine 0.60 0.50 - 1.40 mg/dL    Calcium 9.2 8.5 - 10.5 mg/dL    AST(SGOT) 12 12 - 45 U/L    ALT(SGPT) 12 2 - 50 U/L    Alkaline Phosphatase 79 30 - 99 U/L    Total Bilirubin 0.3 0.1 - 1.5 mg/dL    Albumin 4.4 3.2 - 4.9 g/dL    Total Protein 7.6 6.0 - 8.2 g/dL    Globulin 3.2 1.9 - 3.5 g/dL    A-G Ratio 1.4 g/dL   LIPASE   Result Value Ref Range    Lipase 33 11 - 82 U/L   TROPONIN   Result Value Ref Range    Troponin T <6 6 - 19 ng/L   ESTIMATED GFR   Result Value Ref Range    GFR If African American >60 >60 mL/min/1.73 m 2    GFR If Non African American >60 >60 mL/min/1.73 m 2   BETA-HCG QUALITATIVE SERUM   Result Value Ref Range    Beta-Hcg Qualitative Serum Negative Negative   D-DIMER   Result Value Ref Range    D-Dimer Screen 0.33 0.00 - 0.50 ug/mL (FEU)   EKG (NOW)   Result Value Ref Range    Report       [facility]    Test Date:  2021  Pt Name:    CELESTE FABIAN                Department: ER  MRN:        [id number]                      Room:  Gender:     Female                       Technician: [id number]  :        1980                   Requested By:ER TRIAGE PROTOCOL  Order #:    [id number]                    Reading MD:    Measurements  Intervals                                Axis  Rate:       92                           P:          67  KS:         148                          QRS:        5  QRSD:       82                           T:          16  QT:         348  QTc:        431    Interpretive Statements  SINUS RHYTHM  No previous ECG available for comparison       DX-CHEST-LIMITED (1 VIEW)   Final Result      No radiographic evidence of acute cardiopulmonary process.            Ekg;  nsr 92.  No st elevation, no st depression, qtc 431.  No comparison    2:49 AM  The pt only has pain with deep inspiration, and it is reproducible with palpation. At this time, she has no  acute findings. A normal trop, normal d dimer, and unremarkable ekg. She has no rash, but this could be early muscle strain or shingles. She will be treated with medications and muscle relaxer  for the same.      I you have had any blood pressure issues while here in the emergency department, please see your doctor for a further evaluation or work up.    Differential diagnoses include but not limited to: muscle strain, shingles, mi, pe, ptx, pneumonia     This patient presents with muscle strain  .  At this time, I have counseled the patient/family regarding their medications, pain control, and follow up.  They will continue their medications, if any, as prescribed.  They will return immediately for any worsening symptoms and/or any other medical concerns.  They will see their doctor, or contact the doctor provided, in 1-2 days for follow up.       FINAL IMPRESSION  Muscle strain       Electronically signed by: Ghassan Gregorio D.O., 8/20/2021 12:22 AM

## 2021-08-26 ENCOUNTER — OFFICE VISIT (OUTPATIENT)
Dept: MEDICAL GROUP | Facility: PHYSICIAN GROUP | Age: 41
End: 2021-08-26
Payer: COMMERCIAL

## 2021-08-26 VITALS
SYSTOLIC BLOOD PRESSURE: 100 MMHG | HEIGHT: 64 IN | HEART RATE: 80 BPM | WEIGHT: 190 LBS | TEMPERATURE: 98.4 F | OXYGEN SATURATION: 100 % | BODY MASS INDEX: 32.44 KG/M2 | DIASTOLIC BLOOD PRESSURE: 80 MMHG

## 2021-08-26 DIAGNOSIS — Z71.89 EDUCATED ABOUT COVID-19 VIRUS INFECTION: ICD-10-CM

## 2021-08-26 DIAGNOSIS — L60.0 INGROWING NAIL, RIGHT GREAT TOE: ICD-10-CM

## 2021-08-26 DIAGNOSIS — L72.3 SEBACEOUS CYST: ICD-10-CM

## 2021-08-26 DIAGNOSIS — M25.552 HIP PAIN, CHRONIC, LEFT: ICD-10-CM

## 2021-08-26 DIAGNOSIS — Z12.31 ENCOUNTER FOR SCREENING MAMMOGRAM FOR BREAST CANCER: ICD-10-CM

## 2021-08-26 DIAGNOSIS — G89.29 HIP PAIN, CHRONIC, LEFT: ICD-10-CM

## 2021-08-26 PROBLEM — E66.811 OBESITY (BMI 30.0-34.9): Status: ACTIVE | Noted: 2018-09-27

## 2021-08-26 PROBLEM — E66.9 OBESITY (BMI 30.0-34.9): Status: ACTIVE | Noted: 2018-09-27

## 2021-08-26 PROCEDURE — 99214 OFFICE O/P EST MOD 30 MIN: CPT | Performed by: FAMILY MEDICINE

## 2021-08-26 RX ORDER — ATORVASTATIN CALCIUM 20 MG/1
TABLET, FILM COATED ORAL
COMMUNITY
Start: 2021-07-01

## 2021-08-26 ASSESSMENT — PATIENT HEALTH QUESTIONNAIRE - PHQ9: CLINICAL INTERPRETATION OF PHQ2 SCORE: 0

## 2021-08-26 ASSESSMENT — FIBROSIS 4 INDEX: FIB4 SCORE: 0.48

## 2021-08-26 NOTE — PROGRESS NOTES
CC:Diagnoses of Encounter for screening mammogram for breast cancer, Ingrowing nail, right great toe, Sebaceous cyst, and Hip pain, chronic, left were pertinent to this visit.      HISTORY OF PRESENT ILLNESS: Patient is a 40 y.o. female established patient who presents today to discuss cyst on buttock, ingrown toenail and requesting referral.  Patient also has left hip/buttock pain.  Patient also with questions about COVID-19 vaccination today.    Patient is experiencing worsening left hip/buttock pain with prolonged periods of sitting or standing.  States that this gets better with walking, bending, twisting.    Concern of ingrown toenail on right great toe and patient requesting referral to podiatry today.    Cyst on left buttock times several months that seems to be better with expressing and hot compresses.  Denies any redness, pain, heat.        Patient Active Problem List    Diagnosis Date Noted   • Prepatellar bursitis of left knee 09/18/2020   • Coronary artery disease involving native coronary artery of native heart without angina pectoris 03/12/2020   • Breast cyst, left 10/04/2019   • Well woman exam 10/25/2018   • Prepatellar bursitis of right knee 09/27/2018   • Obesity (BMI 30.0-34.9) 09/27/2018   • Tobacco abuse 03/12/2017   • Encounter for insertion of copper intrauterine contraceptive device (IUD) 09/17/2015   • Uterine leiomyoma 03/06/2014      Allergies:Allegra-d    Current Outpatient Medications   Medication Sig Dispense Refill   • atorvastatin (LIPITOR) 20 MG Tab      • cyclobenzaprine (FLEXERIL) 10 mg Tab Take 1 Tablet by mouth 3 times a day as needed. 30 Tablet 0   • ibuprofen (MOTRIN) 800 MG Tab Take 1 Tablet by mouth every 8 hours as needed. 30 Tablet 0   • ASPIRIN LOW DOSE 81 MG EC tablet Take 81 mg by mouth every day.     • lisinopril (PRINIVIL) 5 MG Tab Take 2.5 mg by mouth.     • metoprolol (LOPRESSOR) 25 MG Tab TAKE 1/2 TAB BY MOUTH TWICE DAILY     • nicotine (NICODERM) 7 MG/24HR PATCH  "24 HR Apply 1 Patch to skin as directed every 24 hours. (Patient not taking: Reported on 9/18/2020) 15 Patch 0     No current facility-administered medications for this visit.       Social History     Tobacco Use   • Smoking status: Current Every Day Smoker     Packs/day: 0.50     Years: 10.00     Pack years: 5.00     Types: Cigarettes   • Smokeless tobacco: Never Used   Vaping Use   • Vaping Use: Never used   Substance Use Topics   • Alcohol use: No   • Drug use: No     Social History     Social History Narrative   • Not on file       Family History   Problem Relation Age of Onset   • Other Mother         Cirrhosis not from drinking   • Blood Disease Father    • Lung Disease Paternal Aunt         From smoking.   • Breast Cancer Paternal Grandmother    • Diabetes Brother    • Kidney Disease Brother        Review of Systems:    Constitutional: No Fevers, Chills  Eyes: No vision changes  ENT: No hearing changes  Resp: No Shortness of breath  CV: No Chest pain  GI: No Nausea/Vomiting  MSK: No weakness  Skin: No rashes  Neuro: No Headaches  Psych: No Suicidal ideations    All remaining systems reviewed and found to be negative, except as stated above.    Exam:    /80 (BP Location: Left arm, Patient Position: Sitting, BP Cuff Size: Adult)   Pulse 80   Temp 36.9 °C (98.4 °F) (Temporal)   Ht 1.626 m (5' 4\")   Wt 86.2 kg (190 lb)   SpO2 100%  Body mass index is 32.61 kg/m².    General:  Well nourished, well developed female in NAD  HENT: Atraumatic, normocephalic  EYES: Extraocular movements intact, pupils equal and reactive to light  NECK: Supple, FROM  CHEST: No deformities, Equal chest expansion  RESP: Unlabored, no stridor or audible wheeze  ABD: Non-Distended  Extremities: No Clubbing, Cyanosis, or Edema.  Left quadratus lumborum weakness noted on leg lift.  Ingrown toenail right great toe.  Skin: Warm/dry, without rashes.  Sebaceous cyst left buttock  Neuro: A/O x 4, CN 2-12 Grossly intact, Motor/sensory " grossly intact  Psych: Normal behavior, normal affect      LABS: 8/19/2021: Results reviewed and discussed with the patient, questions answered.      Assessment/Plan:  1. Encounter for screening mammogram for breast cancer  - MA-SCREENING MAMMO BILAT W/CAD; Future    2. Ingrowing nail, right great toe  New problem to examiner.  Referral to podiatry as below for toenail resection  - REFERRAL TO PODIATRY    3. Sebaceous cyst  New problem to examiner.  Counseled on conservative home therapies and follow-up if worsening or infected.    4. Hip pain, chronic, left  Home physical therapy exercises provided today.  Counseled on follow-up as needed.  Or if worsening    5.  Educated about COVID-19  Time spent counseling today on COVID-19 vaccinations and risk versus benefit in her personal case.  I recommend this vaccination for her today    My total time spent caring for the patient on the day of the encounter was 32 minutes.   This does not include time spent on separately billable procedures/tests.      Please note that this dictation was created using voice recognition software. I have worked with consultants from the vendor as well as technical experts from [facility] to optimize the interface. I have made every reasonable attempt to correct obvious errors, but I expect that there are errors of grammar and possibly content that I did not discover before finalizing the note.

## 2021-11-06 ENCOUNTER — HOSPITAL ENCOUNTER (OUTPATIENT)
Dept: LAB | Facility: MEDICAL CENTER | Age: 41
End: 2021-11-06
Attending: INTERNAL MEDICINE
Payer: COMMERCIAL

## 2021-11-06 LAB
CHOLEST SERPL-MCNC: 127 MG/DL (ref 100–199)
HDLC SERPL-MCNC: 45 MG/DL
LDLC SERPL CALC-MCNC: 58 MG/DL
TRIGL SERPL-MCNC: 118 MG/DL (ref 0–149)

## 2021-11-06 PROCEDURE — 36415 COLL VENOUS BLD VENIPUNCTURE: CPT

## 2021-11-06 PROCEDURE — 80061 LIPID PANEL: CPT

## 2021-12-15 ENCOUNTER — OFFICE VISIT (OUTPATIENT)
Dept: MEDICAL GROUP | Facility: PHYSICIAN GROUP | Age: 41
End: 2021-12-15
Payer: COMMERCIAL

## 2021-12-15 VITALS
HEIGHT: 65 IN | DIASTOLIC BLOOD PRESSURE: 80 MMHG | HEART RATE: 88 BPM | SYSTOLIC BLOOD PRESSURE: 124 MMHG | OXYGEN SATURATION: 98 % | WEIGHT: 192 LBS | BODY MASS INDEX: 31.99 KG/M2 | TEMPERATURE: 98.2 F

## 2021-12-15 DIAGNOSIS — Z12.31 ENCOUNTER FOR SCREENING MAMMOGRAM FOR BREAST CANCER: ICD-10-CM

## 2021-12-15 DIAGNOSIS — E66.9 OBESITY (BMI 30.0-34.9): ICD-10-CM

## 2021-12-15 DIAGNOSIS — I25.10 CORONARY ARTERY DISEASE INVOLVING NATIVE CORONARY ARTERY OF NATIVE HEART WITHOUT ANGINA PECTORIS: ICD-10-CM

## 2021-12-15 DIAGNOSIS — Z23 NEED FOR VACCINATION: ICD-10-CM

## 2021-12-15 DIAGNOSIS — Z72.0 TOBACCO ABUSE: ICD-10-CM

## 2021-12-15 PROCEDURE — 99406 BEHAV CHNG SMOKING 3-10 MIN: CPT | Performed by: FAMILY MEDICINE

## 2021-12-15 PROCEDURE — 90686 IIV4 VACC NO PRSV 0.5 ML IM: CPT | Performed by: FAMILY MEDICINE

## 2021-12-15 PROCEDURE — 90471 IMMUNIZATION ADMIN: CPT | Performed by: FAMILY MEDICINE

## 2021-12-15 PROCEDURE — 99396 PREV VISIT EST AGE 40-64: CPT | Mod: 25 | Performed by: FAMILY MEDICINE

## 2021-12-15 ASSESSMENT — FIBROSIS 4 INDEX: FIB4 SCORE: 0.48

## 2021-12-15 NOTE — PROGRESS NOTES
CC:Diagnoses of Need for vaccination, Tobacco abuse, Obesity (BMI 30.0-34.9), Coronary artery disease involving native coronary artery of native heart without angina pectoris, and Encounter for screening mammogram for breast cancer were pertinent to this visit.    Leo Alarcon is a 40 y.o. female presents for a routine preventive health exam.    Health Maintenance: Completed    No problem-specific Assessment & Plan notes found for this encounter.      Ob-Gyn/ History:    /Para: -0-0-2  Last Pap Smear: .  No history of abnormal pap smears.  Gyn Surgery: None.  Current Contraceptive Method: None.  She is currently sexually active.  Post-menopausal bleeding: Not applicable  Urinary incontinence: None  Folate intake: Adequate    Screening/Preventative Topics:  Advanced directive: Not on file  Osteoporosis Screen/ DEXA: n/a   Diabetes Screening: Negative to date  AAA Screening: n/a  Aspirin Use: 81 mg daily  Diet: Suboptimal  Exercise: Minimal  Screen for depression: PHQ-2: 0   Substance Abuse: None  Safe in relationship   Sun protection used.    Cancer screening  Colorectal Cancer Screening: Not indicated to date  Lung Cancer Screening: Not indicated to date  Cervical Cancer Screening: Due   Breast Cancer Screening: Due now      Patient Active Problem List    Diagnosis Date Noted   • Prepatellar bursitis of left knee 2020   • Coronary artery disease involving native coronary artery of native heart without angina pectoris 2020   • Breast cyst, left 10/04/2019   • Well woman exam 10/25/2018   • Prepatellar bursitis of right knee 2018   • Obesity (BMI 30.0-34.9) 2018   • Tobacco abuse 2017   • Encounter for insertion of copper intrauterine contraceptive device (IUD) 2015   • Uterine leiomyoma 2014      Allergies:Allegra-d    Current Outpatient Medications   Medication Sig Dispense Refill   • atorvastatin (LIPITOR) 20 MG Tab      • ASPIRIN LOW DOSE 81  "MG EC tablet Take 81 mg by mouth every day.     • metoprolol (LOPRESSOR) 25 MG Tab TAKE 1/2 TAB BY MOUTH TWICE DAILY       No current facility-administered medications for this visit.       Social History     Tobacco Use   • Smoking status: Current Every Day Smoker     Packs/day: 0.50     Years: 10.00     Pack years: 5.00     Types: Cigarettes   • Smokeless tobacco: Never Used   Vaping Use   • Vaping Use: Never used   Substance Use Topics   • Alcohol use: No   • Drug use: No     Social History     Social History Narrative   • Not on file       Family History   Problem Relation Age of Onset   • Other Mother         Cirrhosis not from drinking   • Blood Disease Father    • Lung Disease Paternal Aunt         From smoking.   • Breast Cancer Paternal Grandmother    • Diabetes Brother    • Kidney Disease Brother        Review of Systems:    Constitutional: No Fevers, Chills  Eyes: No vision changes  ENT: No hearing changes  Resp: No Shortness of breath  CV: No Chest pain  GI: No Nausea/Vomiting  MSK: No weakness  Skin: No rashes  Neuro: No Headaches  Psych: No Suicidal ideations    All remaining systems reviewed and found to be negative, except as stated above.    Exam:    /80 (BP Location: Right arm, Patient Position: Sitting, BP Cuff Size: Adult)   Pulse 88   Temp 36.8 °C (98.2 °F) (Temporal)   Ht 1.651 m (5' 5\")   Wt 87.1 kg (192 lb)   SpO2 98%  Body mass index is 31.95 kg/m².    General:  Well nourished, well developed female in NAD  HENT: Atraumatic, normocephalic  EYES: Extraocular movements intact, pupils equal and reactive to light  NECK: Supple, FROM  CHEST: No deformities, Equal chest expansion  RESP: Unlabored, no stridor or audible wheeze  ABD: Soft, Nontender, Non-Distended  Extremities: No Clubbing, Cyanosis, or Edema  Skin: Warm/dry, without rashes  Neuro: A/O x 4, CN 2-12 Grossly intact, Motor/sensory grossly intact  Psych: Normal behavior, normal affect    LABS: 8/19/2021 and 11/6/2021: Results " reviewed and discussed with the patient, questions answered.      Assessment/Plan:  1. Need for vaccination  Counseled on COVID-19 vaccination  - INFLUENZA VACCINE QUAD INJ (PF)    2. Tobacco abuse  Patient is advised to stop using tobacco and tobacco products. We discussed abrupt cessation, nicotine gum or patches, medications such as bupropion or Chantix, and nicotine inhalers including electronic cigarettes.  Approximately 10 minutes was spent with the patient discussing related health consequences of tobacco use, nonpharmacologic and pharmacologic treatment modalities.  Educational handout also provided.    3. Obesity (BMI 30.0-34.9)  Counseled on diet, exercise, and lifestyle changes to help with weight loss.  Counseled on the benefits of exercise and cardiovascular health.  Counseled on limiting refined carbohydrates and focus on healthy proteins and healthy fats.  All questions answered prior to end of visit.    4. Coronary artery disease involving native coronary artery of native heart without angina pectoris  Continue atorvastatin, metoprolol, aspirin    5. Encounter for screening mammogram for breast cancer  - MA-SCREENING MAMMO BILAT W/TOMOSYNTHESIS W/CAD; Future      Patient up-to-date on preventative health maintenance examinations and vaccinations.  Age-appropriate anticipatory guidance provided today.    Preventive visit in 1 year, sooner as needed for any concerns.     Please note that this dictation was created using voice recognition software. I have worked with consultants from the vendor as well as technical experts from [employer] to optimize the interface. I have made every reasonable attempt to correct obvious errors, but I expect that there are errors of grammar and possibly content that I did not discover before finalizing the note.

## 2024-11-29 ENCOUNTER — APPOINTMENT (OUTPATIENT)
Dept: URGENT CARE | Facility: PHYSICIAN GROUP | Age: 44
End: 2024-11-29
Payer: COMMERCIAL